# Patient Record
Sex: MALE | Race: WHITE | NOT HISPANIC OR LATINO
[De-identification: names, ages, dates, MRNs, and addresses within clinical notes are randomized per-mention and may not be internally consistent; named-entity substitution may affect disease eponyms.]

---

## 2022-06-03 ENCOUNTER — APPOINTMENT (OUTPATIENT)
Dept: ORTHOPEDIC SURGERY | Facility: CLINIC | Age: 67
End: 2022-06-03
Payer: MEDICARE

## 2022-06-03 VITALS — WEIGHT: 153 LBS | HEIGHT: 68 IN | BODY MASS INDEX: 23.19 KG/M2

## 2022-06-03 DIAGNOSIS — I26.99 OTHER PULMONARY EMBOLISM W/OUT ACUTE COR PULMONALE: ICD-10-CM

## 2022-06-03 DIAGNOSIS — Z78.9 OTHER SPECIFIED HEALTH STATUS: ICD-10-CM

## 2022-06-03 DIAGNOSIS — I10 ESSENTIAL (PRIMARY) HYPERTENSION: ICD-10-CM

## 2022-06-03 PROBLEM — Z00.00 ENCOUNTER FOR PREVENTIVE HEALTH EXAMINATION: Status: ACTIVE | Noted: 2022-06-03

## 2022-06-03 PROCEDURE — 99214 OFFICE O/P EST MOD 30 MIN: CPT

## 2022-06-07 PROBLEM — I26.99 OTHER PULMONARY EMBOLISM WITHOUT ACUTE COR PULMONALE, UNSPECIFIED CHRONICITY: Status: RESOLVED | Noted: 2022-06-03 | Resolved: 2022-06-07

## 2022-06-07 PROBLEM — Z78.9 CURRENT NON-DRINKER OF ALCOHOL: Status: ACTIVE | Noted: 2022-06-03

## 2022-06-07 NOTE — DISCUSSION/SUMMARY
[Medication Risks Reviewed] : Medication risks reviewed [de-identified] : Discussed proper body mechanics and modified physical activity to avoid aggravation of symptoms. Patient will continue with at home exercises/stretches as best tolerated. Patient referred for updated lumbar spine MRI scan for recurring radiating back pain despite medical intervention. Evaluate for stenosis vs disc herniation. Patient will follow up after MRI scan. Patient will continue treatment with Gabapentin and Tramadol as prescribed. Advised patient of habit forming potential with analgesic drug use. Discussed at length underlying pathology of stenosis with associated symptoms. Discussed possible interventional spine injections vs surgical decompression after mri review.

## 2022-06-07 NOTE — HISTORY OF PRESENT ILLNESS
[de-identified] : Patient presents with mid to lower back pain that intermittently radiates into RT lower extremity. Treatment with chiropractic therapy, epidural injections, radio frequency ablation have provided temporary relief. Treatment with Gabapentin and Tramadol as prescribed have provided temporary relief. Patient has continued with at home exercises/stretches as best tolerated. No numbness/tingling sensation to lower extremities b/l.

## 2022-06-07 NOTE — PHYSICAL EXAM
[Normal Coordination] : normal coordination [Normal DTR UE/LE] : normal DTR UE/LE  [Normal Sensation] : normal sensation [Normal Mood and Affect] : normal mood and affect [Orientated] : orientated [Able to Communicate] : able to communicate [Normal Skin] : normal skin [No Rash] : no rash [No Ulcers] : no ulcers [No Lesions] : no lesions [No obvious lymphadenopathy in areas examined] : no obvious lymphadenopathy in areas examined [Well Developed] : well developed [Well Nourished] : well nourished [Peripheral vascular exam is grossly normal] : peripheral vascular exam is grossly normal [No Respiratory Distress] : no respiratory distress [Lungs clear to auscultation bilaterally] : lungs clear to auscultation bilaterally [NL (90)] : forward flexion 90 degrees [NL (30)] : right lateral rotation 30 degrees [NL (45)] : extension 45 degrees [NL (40)] : right lateral bending 40 degrees [Flexion] : flexion [Extension] : extension [4___] : right quadriceps 4[unfilled]/5 [3___] : right dorsiflexors 3[unfilled]/5 [5___] : right extensor hallicus longus 5[unfilled]/5 [] : non-antalgic

## 2022-06-09 ENCOUNTER — FORM ENCOUNTER (OUTPATIENT)
Age: 67
End: 2022-06-09

## 2022-06-10 ENCOUNTER — APPOINTMENT (OUTPATIENT)
Dept: MRI IMAGING | Facility: CLINIC | Age: 67
End: 2022-06-10
Payer: MEDICARE

## 2022-06-10 PROCEDURE — 72148 MRI LUMBAR SPINE W/O DYE: CPT | Mod: MH

## 2022-06-17 ENCOUNTER — APPOINTMENT (OUTPATIENT)
Dept: ORTHOPEDIC SURGERY | Facility: CLINIC | Age: 67
End: 2022-06-17
Payer: MEDICARE

## 2022-06-17 VITALS — WEIGHT: 153 LBS | HEIGHT: 68 IN | BODY MASS INDEX: 23.19 KG/M2

## 2022-06-17 PROCEDURE — 99214 OFFICE O/P EST MOD 30 MIN: CPT

## 2022-06-19 NOTE — PHYSICAL EXAM
[Normal Coordination] : normal coordination [Normal DTR UE/LE] : normal DTR UE/LE  [Normal Sensation] : normal sensation [Normal Mood and Affect] : normal mood and affect [Orientated] : orientated [Able to Communicate] : able to communicate [Normal Skin] : normal skin [No Rash] : no rash [No Ulcers] : no ulcers [No Lesions] : no lesions [No obvious lymphadenopathy in areas examined] : no obvious lymphadenopathy in areas examined [Well Developed] : well developed [Well Nourished] : well nourished [Peripheral vascular exam is grossly normal] : peripheral vascular exam is grossly normal [No Respiratory Distress] : no respiratory distress [Lungs clear to auscultation bilaterally] : lungs clear to auscultation bilaterally [NL (90)] : forward flexion 90 degrees [NL (30)] : right lateral rotation 30 degrees [NL (45)] : extension 45 degrees [NL (40)] : right lateral bending 40 degrees [Flexion] : flexion [Extension] : extension [5___] : right extensor hallicus longus 5[unfilled]/5 [] : not mildly antalgic

## 2022-06-19 NOTE — DISCUSSION/SUMMARY
[Medication Risks Reviewed] : Medication risks reviewed [de-identified] : Patients lumbar MRI was reviewed and discussed. Explained pathology of scoliosis with associated symptoms. Various degrees of DDD. Discussed at length surgical intervention in the form of lumbar fusion. Discussed risks and benefits. Advised patient to continue with radiofrequency ablation and exhaust all conservative treatment before pursuing surgery. Advised patient to continue exercises as best tolerated. Advised patient to have bone density test done. Patient will follow up in 3-4 weeks to discuss if radiofrequency improves symptoms and evaluate pain levels. Patient given referell for bone density test. Will follow up after study.

## 2022-06-19 NOTE — REASON FOR VISIT
[FreeTextEntry2] : Test follow up after mri, done at [facility] on 6/10/2022 of Lumbar Spine

## 2022-06-19 NOTE — HISTORY OF PRESENT ILLNESS
[de-identified] : Patient reports radiofrequency ablation with Dr. Guveara yesterday. Patient reports numbness and pain in RUE in thigh region. Patient has not been receiving chiropractic care recently. Patient has not had bone density tested recently. Patient reports pain level is the same. Patient has been exercising daily at home as best tolerated. Patient reports pain is worse with prolonged standing.Pain is effecting daily activities and quality of life. Conservative treatments provide temporary relief. \par \par

## 2022-07-15 ENCOUNTER — APPOINTMENT (OUTPATIENT)
Dept: ORTHOPEDIC SURGERY | Facility: CLINIC | Age: 67
End: 2022-07-15

## 2022-08-03 ENCOUNTER — APPOINTMENT (OUTPATIENT)
Dept: ORTHOPEDIC SURGERY | Facility: CLINIC | Age: 67
End: 2022-08-03

## 2022-08-03 VITALS — BODY MASS INDEX: 23.19 KG/M2 | WEIGHT: 153 LBS | HEIGHT: 68 IN

## 2022-08-03 PROCEDURE — 99214 OFFICE O/P EST MOD 30 MIN: CPT

## 2022-08-14 NOTE — DATA REVIEWED
[Bone Density] : bone density [MRI] : MRI [Lumbar Spine] : lumbar spine [Report was reviewed and noted in the chart] : The report was reviewed and noted in the chart [I independently reviewed and interpreted images and report] : I independently reviewed and interpreted images and report [I reviewed the films/CD and additionally noted] : I reviewed the films/CD and additionally noted [FreeTextEntry2] : Degenerative lumbar scoliosis, reasonable preservation lumbar lordosis. Post laminectomy changes L2-3. Moderate lateral recess and foraminal stenosis L3-4,L4-5 [FreeTextEntry1] : I stop paperwork reviewed

## 2022-08-14 NOTE — REASON FOR VISIT
[FreeTextEntry2] : Test follow up after Bone Density, done at [facility] \par Ablaton done 07/28/22 with Dr Guevara

## 2022-08-14 NOTE — DISCUSSION/SUMMARY
[Medication Risks Reviewed] : Medication risks reviewed [de-identified] : Patients bone density test was reviewed and discussed, osteopenia diagnosis. Explained pathology of scoliosis with associated symptoms. Various degrees of DDD. At this time, patient wishes to go forward with surgical intervention in the form of lumbar fusion T10-S1. Risks, benefits and expected outcomes have been explained to the patient. Patient was understanding and in agreement. Patient has been refractory to extensive conservative treatments to include medical mgmt, exercise based rehabilitation, activity modification and interventional spine injections.Patient will follow up with surgical jang. \par \vijaya COTTON Acting as a Scribe for Isi Greer, attest that this documentation has been prepared under the direction and in the presence of Provider Mihai Pérez MD.

## 2022-08-14 NOTE — HISTORY OF PRESENT ILLNESS
[de-identified] : Patient presents for a Test follow up after Bone Density, done at [facility]. Reports moderate relief of back pain from ablation with Dr. Guevara approx 1 week ago. He reports 50% improvement of back pain from procedure. Patient has been exercising daily at home as best tolerated. Patient reports pain is worse with prolonged standing. Pain is effecting daily activities and quality of life. Conservative treatments provide temporary relief.\par \par Bone density: Osteopenia. \par

## 2022-08-14 NOTE — PHYSICAL EXAM
[Normal Coordination] : normal coordination [Normal DTR UE/LE] : normal DTR UE/LE  [Normal Sensation] : normal sensation [Normal Mood and Affect] : normal mood and affect [Orientated] : orientated [Able to Communicate] : able to communicate [Normal Skin] : normal skin [No Rash] : no rash [No Ulcers] : no ulcers [No Lesions] : no lesions [No obvious lymphadenopathy in areas examined] : no obvious lymphadenopathy in areas examined [Well Developed] : well developed [Well Nourished] : well nourished [Peripheral vascular exam is grossly normal] : peripheral vascular exam is grossly normal [No Respiratory Distress] : no respiratory distress [Lungs clear to auscultation bilaterally] : lungs clear to auscultation bilaterally [Flexion] : flexion [Extension] : extension [5___] : right extensor hallicus longus 5[unfilled]/5 [] : not mildly antalgic

## 2022-09-06 ENCOUNTER — APPOINTMENT (OUTPATIENT)
Dept: NEUROSURGERY | Facility: CLINIC | Age: 67
End: 2022-09-06

## 2022-09-07 ENCOUNTER — APPOINTMENT (OUTPATIENT)
Dept: ORTHOPEDIC SURGERY | Facility: CLINIC | Age: 67
End: 2022-09-07

## 2022-09-15 ENCOUNTER — APPOINTMENT (OUTPATIENT)
Dept: ORTHOPEDIC SURGERY | Facility: HOSPITAL | Age: 67
End: 2022-09-15

## 2022-09-21 ENCOUNTER — APPOINTMENT (OUTPATIENT)
Dept: ORTHOPEDIC SURGERY | Facility: CLINIC | Age: 67
End: 2022-09-21

## 2022-09-21 ENCOUNTER — OUTPATIENT (OUTPATIENT)
Dept: OUTPATIENT SERVICES | Facility: HOSPITAL | Age: 67
LOS: 1 days | End: 2022-09-21
Payer: MEDICARE

## 2022-09-21 VITALS
SYSTOLIC BLOOD PRESSURE: 126 MMHG | WEIGHT: 155.43 LBS | OXYGEN SATURATION: 98 % | RESPIRATION RATE: 16 BRPM | HEART RATE: 66 BPM | DIASTOLIC BLOOD PRESSURE: 84 MMHG | TEMPERATURE: 96 F | HEIGHT: 67 IN

## 2022-09-21 VITALS — HEIGHT: 68 IN | WEIGHT: 153 LBS | BODY MASS INDEX: 23.19 KG/M2

## 2022-09-21 DIAGNOSIS — G93.9 DISORDER OF BRAIN, UNSPECIFIED: ICD-10-CM

## 2022-09-21 DIAGNOSIS — Z96.649 PRESENCE OF UNSPECIFIED ARTIFICIAL HIP JOINT: Chronic | ICD-10-CM

## 2022-09-21 DIAGNOSIS — Z01.818 ENCOUNTER FOR OTHER PREPROCEDURAL EXAMINATION: ICD-10-CM

## 2022-09-21 DIAGNOSIS — Z98.890 OTHER SPECIFIED POSTPROCEDURAL STATES: Chronic | ICD-10-CM

## 2022-09-21 DIAGNOSIS — Z29.9 ENCOUNTER FOR PROPHYLACTIC MEASURES, UNSPECIFIED: ICD-10-CM

## 2022-09-21 DIAGNOSIS — E78.00 PURE HYPERCHOLESTEROLEMIA, UNSPECIFIED: ICD-10-CM

## 2022-09-21 DIAGNOSIS — I10 ESSENTIAL (PRIMARY) HYPERTENSION: ICD-10-CM

## 2022-09-21 DIAGNOSIS — M48.061 SPINAL STENOSIS, LUMBAR REGION WITHOUT NEUROGENIC CLAUDICATION: ICD-10-CM

## 2022-09-21 DIAGNOSIS — I26.99 OTHER PULMONARY EMBOLISM WITHOUT ACUTE COR PULMONALE: ICD-10-CM

## 2022-09-21 LAB
A1C WITH ESTIMATED AVERAGE GLUCOSE RESULT: 5.3 % — SIGNIFICANT CHANGE UP (ref 4–5.6)
ANION GAP SERPL CALC-SCNC: 12 MMOL/L — SIGNIFICANT CHANGE UP (ref 5–17)
APTT BLD: 30.4 SEC — SIGNIFICANT CHANGE UP (ref 27.5–35.5)
BLD GP AB SCN SERPL QL: SIGNIFICANT CHANGE UP
BUN SERPL-MCNC: 24.5 MG/DL — HIGH (ref 8–20)
CALCIUM SERPL-MCNC: 9.5 MG/DL — SIGNIFICANT CHANGE UP (ref 8.4–10.5)
CHLORIDE SERPL-SCNC: 109 MMOL/L — HIGH (ref 98–107)
CO2 SERPL-SCNC: 21 MMOL/L — LOW (ref 22–29)
CREAT SERPL-MCNC: 0.82 MG/DL — SIGNIFICANT CHANGE UP (ref 0.5–1.3)
EGFR: 96 ML/MIN/1.73M2 — SIGNIFICANT CHANGE UP
ESTIMATED AVERAGE GLUCOSE: 105 MG/DL — SIGNIFICANT CHANGE UP (ref 68–114)
GLUCOSE SERPL-MCNC: 92 MG/DL — SIGNIFICANT CHANGE UP (ref 70–99)
HCT VFR BLD CALC: 41.2 % — SIGNIFICANT CHANGE UP (ref 39–50)
HGB BLD-MCNC: 14.3 G/DL — SIGNIFICANT CHANGE UP (ref 13–17)
INR BLD: 1.09 RATIO — SIGNIFICANT CHANGE UP (ref 0.88–1.16)
MCHC RBC-ENTMCNC: 34.7 GM/DL — SIGNIFICANT CHANGE UP (ref 32–36)
MCHC RBC-ENTMCNC: 35 PG — HIGH (ref 27–34)
MCV RBC AUTO: 101 FL — HIGH (ref 80–100)
MRSA PCR RESULT.: SIGNIFICANT CHANGE UP
PLATELET # BLD AUTO: 198 K/UL — SIGNIFICANT CHANGE UP (ref 150–400)
POTASSIUM SERPL-MCNC: 4.6 MMOL/L — SIGNIFICANT CHANGE UP (ref 3.5–5.3)
POTASSIUM SERPL-SCNC: 4.6 MMOL/L — SIGNIFICANT CHANGE UP (ref 3.5–5.3)
PROTHROM AB SERPL-ACNC: 12.6 SEC — SIGNIFICANT CHANGE UP (ref 10.5–13.4)
RBC # BLD: 4.08 M/UL — LOW (ref 4.2–5.8)
RBC # FLD: 12.2 % — SIGNIFICANT CHANGE UP (ref 10.3–14.5)
S AUREUS DNA NOSE QL NAA+PROBE: SIGNIFICANT CHANGE UP
SARS-COV-2 RNA SPEC QL NAA+PROBE: SIGNIFICANT CHANGE UP
SODIUM SERPL-SCNC: 142 MMOL/L — SIGNIFICANT CHANGE UP (ref 135–145)
WBC # BLD: 5.45 K/UL — SIGNIFICANT CHANGE UP (ref 3.8–10.5)
WBC # FLD AUTO: 5.45 K/UL — SIGNIFICANT CHANGE UP (ref 3.8–10.5)

## 2022-09-21 PROCEDURE — 99213 OFFICE O/P EST LOW 20 MIN: CPT

## 2022-09-21 PROCEDURE — G0463: CPT

## 2022-09-21 PROCEDURE — 93005 ELECTROCARDIOGRAM TRACING: CPT

## 2022-09-21 PROCEDURE — 93010 ELECTROCARDIOGRAM REPORT: CPT

## 2022-09-21 NOTE — HISTORY OF PRESENT ILLNESS
[de-identified] : Patient returns to the office for pre-operative visit. He is scheduled for lumbar laminectomy and fusion 10/6/22. T10 PELVIS TLIF, PSF, LAMINECTOMY L3-5\par \par Since the last office visit His symptoms have remained unchanged.  Preoperative laboratory testing has been completed earlier today. Medical clearance, cardiac clearance and neurology clearance are pending. He has recovered from COVID and is w/o symptoms at this point. Insurance authorization is not necessary.\par \par

## 2022-09-21 NOTE — H&P PST ADULT - NSICDXFAMILYHX_GEN_ALL_CORE_FT
FAMILY HISTORY:  Father  Still living? No  FH: prostate cancer, Age at diagnosis: Age Unknown    Mother  Still living? No  FH: heart attack, Age at diagnosis: Age Unknown

## 2022-09-21 NOTE — H&P PST ADULT - NSANTHBPHIGHRD_ENT_A_CORE
abnormal sputum in past--on empiric ABX-last admit pseudomonas present  ID f/up evaln--re-cx sputum and afb x3 (?nathaniel)--all neg thus far Yes

## 2022-09-21 NOTE — H&P PST ADULT - PROBLEM SELECTOR PLAN 6
T10- Pelvis Transforaminal Lumbar interbody fusion, posterior spine fusion, Laminectomy L3-L5 by dr. Beto Holm on 10/6/22.  Covid vaccine series completed, card in chart, (Moderna X4) he was tested positive for Covid last month(August)  but no PCR test results with him, will repeat the test today, if positive no need for further testing needed if negative will go for the test again 3 days prior to surgery. Medical, Neuro and cardiac clearance pending.

## 2022-09-21 NOTE — H&P PST ADULT - NSANTHOSAYNRD_GEN_A_CORE
No. BUCK screening performed.  STOP BANG Legend: 0-2 = LOW Risk; 3-4 = INTERMEDIATE Risk; 5-8 = HIGH Risk

## 2022-09-21 NOTE — H&P PST ADULT - NSICDXPASTMEDICALHX_GEN_ALL_CORE_FT
PAST MEDICAL HISTORY:  Asthma mild, well controlled not on any meds    COPD, mild     High cholesterol     Hypertension     Lesion of brain     Migraine      PAST MEDICAL HISTORY:  Asthma mild, well controlled not on any meds    COPD, mild     High cholesterol     Hypertension     Lesion of brain     Migraine     Pulmonary embolism On Eliquis     PAST MEDICAL HISTORY:  Asthma mild, well controlled not on any meds    COPD, mild     High cholesterol     Hypertension     Lesion of brain ? patient not sure exactly what it is he said "something with my whitematter" i have migraines due to it.    Migraine     Pulmonary embolism On Eliquis

## 2022-09-21 NOTE — H&P PST ADULT - HISTORY OF PRESENT ILLNESS
67 year old male  with mid to lower back pain that intermittently radiates into RT lower extremity and right hip, Treatment with chiropractic therapy, epidural injections, radio frequency ablation have provided temporary relief. Treatment with Gabapentin and Tramadol as prescribed have provided temporary relief. Patient has continued with at home exercises/stretches as best tolerated. No numbness/tingling sensation to lower extremities b/l. Now he is scheduled for a T10- Pelvis Transforaminal Lumbar interbody fusion, posterior spine fusion, Laminectomy L3-L5 by dr. Beto Holm on 10/6/22.  Covid vaccine series completed, card in chart, (Moderna X4) he was tested positive for Covid last month, but no PCR test results with him, will repeat the test today, if positive no need for further testing needed if negative will go for the test again 3 days prior to surgery. Medical, Neuro and cardiac clearance pending      67 year old male  with mid to lower back pain that intermittently radiates into RT lower extremity and right hip, had treatment with chiropractic therapy, epidural injections, radio frequency ablation have only provided temporary relief. Treatment with Gabapentin and Tramadol as prescribed have provided temporary relief also,  Patient has continued with at home exercises/stretches as best tolerated. No numbness/tingling sensation to lower extremities b/l. Now he is scheduled for a T10- Pelvis Transforaminal Lumbar interbody fusion, posterior spine fusion, Laminectomy L3-L5 by dr. Beto Holm on 10/6/22.  Covid vaccine series completed, card in chart, (Moderna X4) he was tested positive for Covid last month(August)  but no PCR test results with him, will repeat the test today, if positive no need for further testing needed if negative will go for the test again 3 days prior to surgery. Medical, Neuro and cardiac clearance pending

## 2022-09-21 NOTE — DISCUSSION/SUMMARY
[Surgical risks reviewed] : Surgical risks reviewed [de-identified] : Together in the office we reviewed the current diagnosis and its contributions to His symptoms.  The planned surgical procedure was reviewed and explained to His satisfaction including the risks and benefits.  This risk benefit conversation included, but was not limited to infection, bleeding, neurological injury, CSF leak, need for dural repair, hardware mal position, pseudoarthrosis, need for additional operation in the future, risks of general anesthesia. Expected outcomes included reduction in pain, improvement in function and expected recovery timeframe. All questions were answered to their satisfaction.\par \par Patient was reminded to bring their diagnostic imaging to the OR on the date of surgery.\par \par \par \par \par

## 2022-09-21 NOTE — H&P PST ADULT - NSICDXPASTSURGICALHX_GEN_ALL_CORE_FT
PAST SURGICAL HISTORY:  H/O foot surgery right hammer toe    H/O laminectomy     History of hand surgery right wrist X10    History of hip replacement B/L twice, total 4    History of prostate surgery

## 2022-09-21 NOTE — H&P PST ADULT - ASSESSMENT
medications reviewed, instructions given on what medications to take and what not to take. ERP teaching given and the pt verbalized understanding.  67 year old male  with mid to lower back pain that intermittently radiates into RT lower extremity and right hip, had treatment with chiropractic therapy, epidural injections, radio frequency ablation have only provided temporary relief. Treatment with Gabapentin and Tramadol as prescribed have provided temporary relief also,  Patient has continued with at home exercises/stretches as best tolerated. No numbness/tingling sensation to lower extremities b/l. Now he is scheduled for a T10- Pelvis Transforaminal Lumbar interbody fusion, posterior spine fusion, Laminectomy L3-L5 by dr. Beto Holm on 10/6/22.  Covid vaccine series completed, card in chart, (Moderna X4) he was tested positive for Covid last month(August)  but no PCR test results with him, will repeat the test today, if positive no need for further testing needed if negative will go for the test again 3 days prior to surgery. Medical, Neuro and cardiac clearance pending.    CAPRINI VTE 2.0 SCORE [CLOT updated 2019]    AGE RELATED RISK FACTORS                                                       MOBILITY RELATED FACTORS  [ ] Age 41-60 years                                            (1 Point)                    [ ] Bed rest                                                        (1 Point)  [ x] Age: 61-74 years                                           (2 Points)                  [ ] Plaster cast                                                   (2 Points)  [ ] Age= 75 years                                              (3 Points)                    [ ] Bed bound for more than 72 hours                 (2 Points)    DISEASE RELATED RISK FACTORS                                               GENDER SPECIFIC FACTORS  [ ] Edema in the lower extremities                       (1 Point)              [ ] Pregnancy                                                     (1 Point)  [ ] Varicose veins                                               (1 Point)                     [ ] Post-partum < 6 weeks                                   (1 Point)             [ ] BMI > 25 Kg/m2                                            (1 Point)                     [ ] Hormonal therapy  or oral contraception          (1 Point)                 [ ] Sepsis (in the previous month)                        (1 Point)               [ ] History of pregnancy complications                 (1 point)  [ ] Pneumonia or serious lung disease                                               [ ] Unexplained or recurrent                     (1 Point)           (in the previous month)                               (1 Point)  [ ] Abnormal pulmonary function test                     (1 Point)                 SURGERY RELATED RISK FACTORS  [ ] Acute myocardial infarction                              (1 Point)               [ ]  Section                                             (1 Point)  [ ] Congestive heart failure (in the previous month)  (1 Point)      [ ] Minor surgery                                                  (1 Point)   [ ] Inflammatory bowel disease                             (1 Point)               [ ] Arthroscopic surgery                                        (2 Points)  [ ] Central venous access                                      (2 Points)                [x ] General surgery lasting more than 45 minutes (2 points)  [ ] Malignancy- Present or previous                   (2 Points)                [ ] Elective arthroplasty                                         (5 points)    [ ] Stroke (in the previous month)                          (5 Points)                                                                                                                                                           HEMATOLOGY RELATED FACTORS                                                 TRAUMA RELATED RISK FACTORS  [x ] Prior episodes of VTE                                     (3 Points)                [ ] Fracture of the hip, pelvis, or leg                       (5 Points)  [ ] Positive family history for VTE                         (3 Points)             [ ] Acute spinal cord injury (in the previous month)  (5 Points)  [ ] Prothrombin 91628 A                                     (3 Points)               [ ] Paralysis  (less than 1 month)                             (5 Points)  [ ] Factor V Leiden                                             (3 Points)                  [ ] Multiple Trauma within 1 month                        (5 Points)  [ ] Lupus anticoagulants                                     (3 Points)                                                           [ ] Anticardiolipin antibodies                               (3 Points)                                                       [ ] High homocysteine in the blood                      (3 Points)                                             [ ] Other congenital or acquired thrombophilia      (3 Points)                                                [ ] Heparin induced thrombocytopenia                  (3 Points)                                     Total Score [    7      ]  OPIOID RISK TOOL    GUILLERMO EACH BOX THAT APPLIES AND ADD TOTALS AT THE END    FAMILY HISTORY OF SUBSTANCE ABUSE                 FEMALE         MALE                                                Alcohol                             [  ]1 pt          [  ]3pts                                               Illegal Drugs                     [  ]2 pts        [  ]3pts                                               Rx Drugs                           [  ]4 pts        [  ]4 pts    PERSONAL HISTORY OF SUBSTANCE ABUSE                                                                                          Alcohol                             [  ]3 pts       [  ]3 pts                                               Illegal Drugs                     [  ]4 pts        [  ]4 pts                                               Rx Drugs                           [  ]5 pts        [  ]5 pts    AGE BETWEEN 16-45 YEARS                                      [  ]1 pt         [  ]1 pt    HISTORY OF PREADOLESCENT   SEXUAL ABUSE                                                             [  ]3 pts        [  ]0pts    PSYCHOLOGICAL DISEASE                     ADD, OCD, Bipolar, Schizophrenia        [  ]2 pts         [  ]2 pts                      Depression                                               [  ]1 pt           [  ]1 pt           SCORING TOTAL   (add numbers and type here)              ( 0)                                     A score of 3 or lower indicated LOW risk for future opioid abuse  A score of 4 to 7 indicated moderate risk for future opioid abuse  A score of 8 or higher indicates a high risk for opioid abuse          medications reviewed, instructions given on what medications to take and what not to take. ERP teaching given and the pt verbalized understanding.  67 year old male  with mid to lower back pain that intermittently radiates into RT lower extremity and right hip, had treatment with chiropractic therapy, epidural injections, radio frequency ablation have only provided temporary relief. Treatment with Gabapentin and Tramadol as prescribed have provided temporary relief also,  Patient has continued with at home exercises/stretches as best tolerated. No numbness/tingling sensation to lower extremities b/l. Now he is scheduled for a T10- Pelvis Transforaminal Lumbar interbody fusion, posterior spine fusion, Laminectomy L3-L5 by dr. Beto Holm on 10/6/22.  Covid vaccine series completed, card in chart, (Moderna X4) he was tested positive for Covid last month(August)  but no PCR test results with him, will repeat the test today, if positive no need for further testing needed if negative will go for the test again 3 days prior to surgery. Medical, Neuro and cardiac clearance pending.    medications reviewed, instructions given on what medications to take and what not to take. Stop Motrin 7-10 days prior to surgery. Asked the patient to consult with PCP dr. Howard about holding Eliquis and stop/Hold it accordingly, failure to do so may result in cancellation or postponement of your surgery, pt  agreed and verbalized understanding. Asked the patient to take the Blood pressure medication/ heart medication or any other important meds with a sip of water in the AM of surgery ( Gabapentin, Lisinopril, Tramadol) ERP teaching given and the pt verbalized understanding.     CAPRINI VTE 2.0 SCORE [CLOT updated 2019]    AGE RELATED RISK FACTORS                                                       MOBILITY RELATED FACTORS  [ ] Age 41-60 years                                            (1 Point)                    [ ] Bed rest                                                        (1 Point)  [ x] Age: 61-74 years                                           (2 Points)                  [ ] Plaster cast                                                   (2 Points)  [ ] Age= 75 years                                              (3 Points)                    [ ] Bed bound for more than 72 hours                 (2 Points)    DISEASE RELATED RISK FACTORS                                               GENDER SPECIFIC FACTORS  [ ] Edema in the lower extremities                       (1 Point)              [ ] Pregnancy                                                     (1 Point)  [ ] Varicose veins                                               (1 Point)                     [ ] Post-partum < 6 weeks                                   (1 Point)             [ ] BMI > 25 Kg/m2                                            (1 Point)                     [ ] Hormonal therapy  or oral contraception          (1 Point)                 [ ] Sepsis (in the previous month)                        (1 Point)               [ ] History of pregnancy complications                 (1 point)  [ ] Pneumonia or serious lung disease                                               [ ] Unexplained or recurrent                     (1 Point)           (in the previous month)                               (1 Point)  [ ] Abnormal pulmonary function test                     (1 Point)                 SURGERY RELATED RISK FACTORS  [ ] Acute myocardial infarction                              (1 Point)               [ ]  Section                                             (1 Point)  [ ] Congestive heart failure (in the previous month)  (1 Point)      [ ] Minor surgery                                                  (1 Point)   [ ] Inflammatory bowel disease                             (1 Point)               [ ] Arthroscopic surgery                                        (2 Points)  [ ] Central venous access                                      (2 Points)                [x ] General surgery lasting more than 45 minutes (2 points)  [ ] Malignancy- Present or previous                   (2 Points)                [ ] Elective arthroplasty                                         (5 points)    [ ] Stroke (in the previous month)                          (5 Points)                                                                                                                                                           HEMATOLOGY RELATED FACTORS                                                 TRAUMA RELATED RISK FACTORS  [x ] Prior episodes of VTE                                     (3 Points)                [ ] Fracture of the hip, pelvis, or leg                       (5 Points)  [ ] Positive family history for VTE                         (3 Points)             [ ] Acute spinal cord injury (in the previous month)  (5 Points)  [ ] Prothrombin 03195 A                                     (3 Points)               [ ] Paralysis  (less than 1 month)                             (5 Points)  [ ] Factor V Leiden                                             (3 Points)                  [ ] Multiple Trauma within 1 month                        (5 Points)  [ ] Lupus anticoagulants                                     (3 Points)                                                           [ ] Anticardiolipin antibodies                               (3 Points)                                                       [ ] High homocysteine in the blood                      (3 Points)                                             [ ] Other congenital or acquired thrombophilia      (3 Points)                                                [ ] Heparin induced thrombocytopenia                  (3 Points)                                     Total Score [    7      ]  OPIOID RISK TOOL    GUILLERMO EACH BOX THAT APPLIES AND ADD TOTALS AT THE END    FAMILY HISTORY OF SUBSTANCE ABUSE                 FEMALE         MALE                                                Alcohol                             [  ]1 pt          [  ]3pts                                               Illegal Drugs                     [  ]2 pts        [  ]3pts                                               Rx Drugs                           [  ]4 pts        [  ]4 pts    PERSONAL HISTORY OF SUBSTANCE ABUSE                                                                                          Alcohol                             [  ]3 pts       [  ]3 pts                                               Illegal Drugs                     [  ]4 pts        [  ]4 pts                                               Rx Drugs                           [  ]5 pts        [  ]5 pts    AGE BETWEEN 16-45 YEARS                                      [  ]1 pt         [  ]1 pt    HISTORY OF PREADOLESCENT   SEXUAL ABUSE                                                             [  ]3 pts        [  ]0pts    PSYCHOLOGICAL DISEASE                     ADD, OCD, Bipolar, Schizophrenia        [  ]2 pts         [  ]2 pts                      Depression                                               [  ]1 pt           [  ]1 pt           SCORING TOTAL   (add numbers and type here)              ( 0)                                     A score of 3 or lower indicated LOW risk for future opioid abuse  A score of 4 to 7 indicated moderate risk for future opioid abuse  A score of 8 or higher indicates a high risk for opioid abuse          medications reviewed, instructions given on what medications to take and what not to take. ERP teaching given and the pt verbalized understanding.

## 2022-10-05 ENCOUNTER — OUTPATIENT (OUTPATIENT)
Dept: OUTPATIENT SERVICES | Facility: HOSPITAL | Age: 67
LOS: 1 days | End: 2022-10-05

## 2022-10-05 ENCOUNTER — TRANSCRIPTION ENCOUNTER (OUTPATIENT)
Age: 67
End: 2022-10-05

## 2022-10-05 DIAGNOSIS — Z98.890 OTHER SPECIFIED POSTPROCEDURAL STATES: Chronic | ICD-10-CM

## 2022-10-05 DIAGNOSIS — Z96.649 PRESENCE OF UNSPECIFIED ARTIFICIAL HIP JOINT: Chronic | ICD-10-CM

## 2022-10-05 DIAGNOSIS — Z20.828 CONTACT WITH AND (SUSPECTED) EXPOSURE TO OTHER VIRAL COMMUNICABLE DISEASES: ICD-10-CM

## 2022-10-05 PROBLEM — J44.9 CHRONIC OBSTRUCTIVE PULMONARY DISEASE, UNSPECIFIED: Chronic | Status: ACTIVE | Noted: 2022-09-21

## 2022-10-05 PROBLEM — E78.00 PURE HYPERCHOLESTEROLEMIA, UNSPECIFIED: Chronic | Status: ACTIVE | Noted: 2022-09-21

## 2022-10-05 PROBLEM — I26.99 OTHER PULMONARY EMBOLISM WITHOUT ACUTE COR PULMONALE: Chronic | Status: ACTIVE | Noted: 2022-09-21

## 2022-10-05 PROBLEM — G93.9 DISORDER OF BRAIN, UNSPECIFIED: Chronic | Status: ACTIVE | Noted: 2022-09-21

## 2022-10-05 PROBLEM — J45.909 UNSPECIFIED ASTHMA, UNCOMPLICATED: Chronic | Status: ACTIVE | Noted: 2022-09-21

## 2022-10-05 PROBLEM — I10 ESSENTIAL (PRIMARY) HYPERTENSION: Chronic | Status: ACTIVE | Noted: 2022-09-21

## 2022-10-05 PROBLEM — G43.909 MIGRAINE, UNSPECIFIED, NOT INTRACTABLE, WITHOUT STATUS MIGRAINOSUS: Chronic | Status: ACTIVE | Noted: 2022-09-21

## 2022-10-05 NOTE — PATIENT PROFILE ADULT - FALL HARM RISK - HARM RISK INTERVENTIONS
Assistance with ambulation/Assistance OOB with selected safe patient handling equipment/Communicate Risk of Fall with Harm to all staff/Discuss with provider need for PT consult/Monitor gait and stability/Provide patient with walking aids - walker, cane, crutches/Reinforce activity limits and safety measures with patient and family/Sit up slowly, dangle for a short time, stand at bedside before walking/Tailored Fall Risk Interventions/Use of alarms - bed, chair and/or voice tab/Visual Cue: Yellow wristband and red socks/Bed in lowest position, wheels locked, appropriate side rails in place/Call bell, personal items and telephone in reach/Instruct patient to call for assistance before getting out of bed or chair/Non-slip footwear when patient is out of bed/[place] to call system/Physically safe environment - no spills, clutter or unnecessary equipment/Purposeful Proactive Rounding/Room/bathroom lighting operational, light cord in reach

## 2022-10-06 ENCOUNTER — INPATIENT (INPATIENT)
Facility: HOSPITAL | Age: 67
LOS: 6 days | Discharge: NURSING, REHAB OR LONG-TERM CARE | DRG: 456 | End: 2022-10-13
Attending: SPECIALIST | Admitting: SPECIALIST
Payer: MEDICARE

## 2022-10-06 ENCOUNTER — APPOINTMENT (OUTPATIENT)
Dept: ORTHOPEDIC SURGERY | Facility: HOSPITAL | Age: 67
End: 2022-10-06
Payer: MEDICARE

## 2022-10-06 ENCOUNTER — TRANSCRIPTION ENCOUNTER (OUTPATIENT)
Age: 67
End: 2022-10-06

## 2022-10-06 ENCOUNTER — APPOINTMENT (OUTPATIENT)
Age: 67
End: 2022-10-06

## 2022-10-06 VITALS
WEIGHT: 154.32 LBS | OXYGEN SATURATION: 99 % | HEART RATE: 66 BPM | HEIGHT: 67 IN | DIASTOLIC BLOOD PRESSURE: 80 MMHG | SYSTOLIC BLOOD PRESSURE: 148 MMHG | RESPIRATION RATE: 15 BRPM | TEMPERATURE: 98 F

## 2022-10-06 DIAGNOSIS — Z96.649 PRESENCE OF UNSPECIFIED ARTIFICIAL HIP JOINT: Chronic | ICD-10-CM

## 2022-10-06 DIAGNOSIS — Z98.890 OTHER SPECIFIED POSTPROCEDURAL STATES: Chronic | ICD-10-CM

## 2022-10-06 DIAGNOSIS — M48.061 SPINAL STENOSIS, LUMBAR REGION WITHOUT NEUROGENIC CLAUDICATION: ICD-10-CM

## 2022-10-06 LAB
ABO RH CONFIRMATION: SIGNIFICANT CHANGE UP
GAS PNL BLDA: SIGNIFICANT CHANGE UP
GLUCOSE BLDC GLUCOMTR-MCNC: 123 MG/DL — HIGH (ref 70–99)
GLUCOSE BLDC GLUCOMTR-MCNC: 83 MG/DL — SIGNIFICANT CHANGE UP (ref 70–99)

## 2022-10-06 PROCEDURE — 22843 INSERT SPINE FIXATION DEVICE: CPT

## 2022-10-06 PROCEDURE — 22614 ARTHRD PST TQ 1NTRSPC EA ADD: CPT | Mod: 59,62

## 2022-10-06 PROCEDURE — 63048 LAM FACETEC &FORAMOT EA ADDL: CPT | Mod: 59,62

## 2022-10-06 PROCEDURE — 22633 ARTHRD CMBN 1NTRSPC LUMBAR: CPT | Mod: 62

## 2022-10-06 PROCEDURE — 22843 INSERT SPINE FIXATION DEVICE: CPT | Mod: 80

## 2022-10-06 PROCEDURE — 22214 INCIS 1 VERTEBRAL SEG LUMBAR: CPT | Mod: 62

## 2022-10-06 PROCEDURE — 63047 LAM FACETEC & FORAMOT LUMBAR: CPT | Mod: 59,62

## 2022-10-06 PROCEDURE — 22848 INSERT PELV FIXATION DEVICE: CPT

## 2022-10-06 PROCEDURE — 20937 SP BONE AGRFT MORSEL ADD-ON: CPT | Mod: 80

## 2022-10-06 PROCEDURE — 20937 SP BONE AGRFT MORSEL ADD-ON: CPT

## 2022-10-06 PROCEDURE — 22853 INSJ BIOMECHANICAL DEVICE: CPT

## 2022-10-06 PROCEDURE — 22848 INSERT PELV FIXATION DEVICE: CPT | Mod: 80

## 2022-10-06 PROCEDURE — 22853 INSJ BIOMECHANICAL DEVICE: CPT | Mod: 80

## 2022-10-06 DEVICE — SET SCREW EVEREST ATR: Type: IMPLANTABLE DEVICE | Status: FUNCTIONAL

## 2022-10-06 DEVICE — IMPLANTABLE DEVICE: Type: IMPLANTABLE DEVICE | Status: FUNCTIONAL

## 2022-10-06 DEVICE — SURGIFOAM PAD SZ 100: Type: IMPLANTABLE DEVICE | Status: FUNCTIONAL

## 2022-10-06 DEVICE — SPONGE HSTAT SURGCEL 4 X 8IN: Type: IMPLANTABLE DEVICE | Status: FUNCTIONAL

## 2022-10-06 DEVICE — GRAFT BONE INFUSE KIT LG II: Type: IMPLANTABLE DEVICE | Status: FUNCTIONAL

## 2022-10-06 DEVICE — SCREW EVEREST POLY 7.5X45MM: Type: IMPLANTABLE DEVICE | Status: FUNCTIONAL

## 2022-10-06 DEVICE — SEALANT FLOSEAL FAST PREP HEMOSTATIC MATRIX 10ML: Type: IMPLANTABLE DEVICE | Status: FUNCTIONAL

## 2022-10-06 DEVICE — SCREW POLY 7.5X40MM: Type: IMPLANTABLE DEVICE | Status: FUNCTIONAL

## 2022-10-06 DEVICE — KIT A-LINE 1LUM 20GX12CM SAFE KIT: Type: IMPLANTABLE DEVICE | Status: FUNCTIONAL

## 2022-10-06 DEVICE — GRAFT DURAL MATRX 1 X 3IN DURGN: Type: IMPLANTABLE DEVICE | Status: FUNCTIONAL

## 2022-10-06 DEVICE — SCREW EVEREST POLY 6.5X40MM: Type: IMPLANTABLE DEVICE | Status: FUNCTIONAL

## 2022-10-06 DEVICE — GRAFT VITOSIS BIMODAL 10CC: Type: IMPLANTABLE DEVICE | Status: FUNCTIONAL

## 2022-10-06 DEVICE — BONE WAX 2.5GM: Type: IMPLANTABLE DEVICE | Status: FUNCTIONAL

## 2022-10-06 DEVICE — SCREW EVEREST POLY 7.5X50MM: Type: IMPLANTABLE DEVICE | Status: FUNCTIONAL

## 2022-10-06 DEVICE — SCREW POLY 6.5X45MM: Type: IMPLANTABLE DEVICE | Status: FUNCTIONAL

## 2022-10-06 RX ORDER — CEFAZOLIN SODIUM 1 G
2000 VIAL (EA) INJECTION EVERY 8 HOURS
Refills: 0 | Status: DISCONTINUED | OUTPATIENT
Start: 2022-10-06 | End: 2022-10-12

## 2022-10-06 RX ORDER — CEFAZOLIN SODIUM 1 G
2000 VIAL (EA) INJECTION ONCE
Refills: 0 | Status: DISCONTINUED | OUTPATIENT
Start: 2022-10-06 | End: 2022-10-06

## 2022-10-06 RX ORDER — APIXABAN 2.5 MG/1
5 TABLET, FILM COATED ORAL
Refills: 0 | Status: DISCONTINUED | OUTPATIENT
Start: 2022-10-07 | End: 2022-10-13

## 2022-10-06 RX ORDER — ACETAMINOPHEN 500 MG
975 TABLET ORAL ONCE
Refills: 0 | Status: COMPLETED | OUTPATIENT
Start: 2022-10-06 | End: 2022-10-06

## 2022-10-06 RX ORDER — SIMVASTATIN 20 MG/1
1 TABLET, FILM COATED ORAL
Qty: 0 | Refills: 0 | DISCHARGE

## 2022-10-06 RX ORDER — APIXABAN 2.5 MG/1
1 TABLET, FILM COATED ORAL
Qty: 0 | Refills: 0 | DISCHARGE

## 2022-10-06 RX ORDER — APREPITANT 80 MG/1
40 CAPSULE ORAL ONCE
Refills: 0 | Status: DISCONTINUED | OUTPATIENT
Start: 2022-10-06 | End: 2022-10-06

## 2022-10-06 RX ORDER — MAGNESIUM HYDROXIDE 400 MG/1
30 TABLET, CHEWABLE ORAL EVERY 12 HOURS
Refills: 0 | Status: DISCONTINUED | OUTPATIENT
Start: 2022-10-06 | End: 2022-10-13

## 2022-10-06 RX ORDER — METHADONE HYDROCHLORIDE 40 MG/1
20 TABLET ORAL ONCE
Refills: 0 | Status: DISCONTINUED | OUTPATIENT
Start: 2022-10-06 | End: 2022-10-06

## 2022-10-06 RX ORDER — NALOXONE HYDROCHLORIDE 4 MG/.1ML
0.1 SPRAY NASAL
Refills: 0 | Status: DISCONTINUED | OUTPATIENT
Start: 2022-10-06 | End: 2022-10-13

## 2022-10-06 RX ORDER — ACETAMINOPHEN 500 MG
1000 TABLET ORAL ONCE
Refills: 0 | Status: COMPLETED | OUTPATIENT
Start: 2022-10-06 | End: 2022-10-07

## 2022-10-06 RX ORDER — GABAPENTIN 400 MG/1
1200 CAPSULE ORAL
Refills: 0 | Status: DISCONTINUED | OUTPATIENT
Start: 2022-10-06 | End: 2022-10-13

## 2022-10-06 RX ORDER — ONDANSETRON 8 MG/1
4 TABLET, FILM COATED ORAL EVERY 6 HOURS
Refills: 0 | Status: DISCONTINUED | OUTPATIENT
Start: 2022-10-06 | End: 2022-10-13

## 2022-10-06 RX ORDER — ALBUMIN HUMAN 25 %
250 VIAL (ML) INTRAVENOUS
Refills: 0 | Status: DISCONTINUED | OUTPATIENT
Start: 2022-10-06 | End: 2022-10-13

## 2022-10-06 RX ORDER — TOPIRAMATE 25 MG
1 TABLET ORAL
Qty: 0 | Refills: 0 | DISCHARGE

## 2022-10-06 RX ORDER — SODIUM CHLORIDE 9 MG/ML
1000 INJECTION INTRAMUSCULAR; INTRAVENOUS; SUBCUTANEOUS
Refills: 0 | Status: DISCONTINUED | OUTPATIENT
Start: 2022-10-06 | End: 2022-10-07

## 2022-10-06 RX ORDER — SENNA PLUS 8.6 MG/1
2 TABLET ORAL AT BEDTIME
Refills: 0 | Status: DISCONTINUED | OUTPATIENT
Start: 2022-10-06 | End: 2022-10-13

## 2022-10-06 RX ORDER — CHOLINE 650 MG
0 TABLET ORAL
Qty: 0 | Refills: 0 | DISCHARGE

## 2022-10-06 RX ORDER — CELECOXIB 200 MG/1
400 CAPSULE ORAL ONCE
Refills: 0 | Status: COMPLETED | OUTPATIENT
Start: 2022-10-06 | End: 2022-10-06

## 2022-10-06 RX ORDER — SODIUM CHLORIDE 9 MG/ML
3 INJECTION INTRAMUSCULAR; INTRAVENOUS; SUBCUTANEOUS EVERY 8 HOURS
Refills: 0 | Status: DISCONTINUED | OUTPATIENT
Start: 2022-10-06 | End: 2022-10-06

## 2022-10-06 RX ORDER — HYDROMORPHONE HYDROCHLORIDE 2 MG/ML
30 INJECTION INTRAMUSCULAR; INTRAVENOUS; SUBCUTANEOUS
Refills: 0 | Status: DISCONTINUED | OUTPATIENT
Start: 2022-10-06 | End: 2022-10-07

## 2022-10-06 RX ORDER — KETAMINE HYDROCHLORIDE 100 MG/ML
0.25 INJECTION INTRAMUSCULAR; INTRAVENOUS
Qty: 1000 | Refills: 0 | Status: DISCONTINUED | OUTPATIENT
Start: 2022-10-06 | End: 2022-10-06

## 2022-10-06 RX ORDER — ONDANSETRON 8 MG/1
4 TABLET, FILM COATED ORAL ONCE
Refills: 0 | Status: DISCONTINUED | OUTPATIENT
Start: 2022-10-06 | End: 2022-10-06

## 2022-10-06 RX ORDER — HYDROMORPHONE HYDROCHLORIDE 2 MG/ML
0.5 INJECTION INTRAMUSCULAR; INTRAVENOUS; SUBCUTANEOUS
Refills: 0 | Status: DISCONTINUED | OUTPATIENT
Start: 2022-10-06 | End: 2022-10-06

## 2022-10-06 RX ORDER — TRAMADOL HYDROCHLORIDE 50 MG/1
3 TABLET ORAL
Qty: 0 | Refills: 0 | DISCHARGE

## 2022-10-06 RX ORDER — SIMVASTATIN 20 MG/1
20 TABLET, FILM COATED ORAL AT BEDTIME
Refills: 0 | Status: DISCONTINUED | OUTPATIENT
Start: 2022-10-06 | End: 2022-10-13

## 2022-10-06 RX ORDER — ACETAMINOPHEN 500 MG
975 TABLET ORAL EVERY 8 HOURS
Refills: 0 | Status: DISCONTINUED | OUTPATIENT
Start: 2022-10-06 | End: 2022-10-13

## 2022-10-06 RX ORDER — POLYETHYLENE GLYCOL 3350 17 G/17G
17 POWDER, FOR SOLUTION ORAL AT BEDTIME
Refills: 0 | Status: DISCONTINUED | OUTPATIENT
Start: 2022-10-06 | End: 2022-10-13

## 2022-10-06 RX ORDER — FAMOTIDINE 10 MG/ML
20 INJECTION INTRAVENOUS EVERY 12 HOURS
Refills: 0 | Status: DISCONTINUED | OUTPATIENT
Start: 2022-10-06 | End: 2022-10-13

## 2022-10-06 RX ORDER — LISINOPRIL 2.5 MG/1
10 TABLET ORAL DAILY
Refills: 0 | Status: DISCONTINUED | OUTPATIENT
Start: 2022-10-08 | End: 2022-10-13

## 2022-10-06 RX ORDER — METHOCARBAMOL 500 MG/1
750 TABLET, FILM COATED ORAL EVERY 8 HOURS
Refills: 0 | Status: DISCONTINUED | OUTPATIENT
Start: 2022-10-06 | End: 2022-10-07

## 2022-10-06 RX ORDER — LISINOPRIL 2.5 MG/1
1 TABLET ORAL
Qty: 0 | Refills: 0 | DISCHARGE

## 2022-10-06 RX ORDER — GABAPENTIN 400 MG/1
4 CAPSULE ORAL
Qty: 0 | Refills: 0 | DISCHARGE

## 2022-10-06 RX ORDER — BENZOCAINE AND MENTHOL 5; 1 G/100ML; G/100ML
1 LIQUID ORAL EVERY 4 HOURS
Refills: 0 | Status: DISCONTINUED | OUTPATIENT
Start: 2022-10-06 | End: 2022-10-13

## 2022-10-06 RX ADMIN — Medication 975 MILLIGRAM(S): at 06:34

## 2022-10-06 RX ADMIN — HYDROMORPHONE HYDROCHLORIDE 30 MILLILITER(S): 2 INJECTION INTRAMUSCULAR; INTRAVENOUS; SUBCUTANEOUS at 21:11

## 2022-10-06 RX ADMIN — Medication 100 MILLIGRAM(S): at 19:40

## 2022-10-06 RX ADMIN — GABAPENTIN 1200 MILLIGRAM(S): 400 CAPSULE ORAL at 17:56

## 2022-10-06 RX ADMIN — SODIUM CHLORIDE 75 MILLILITER(S): 9 INJECTION INTRAMUSCULAR; INTRAVENOUS; SUBCUTANEOUS at 21:11

## 2022-10-06 RX ADMIN — HYDROMORPHONE HYDROCHLORIDE 30 MILLILITER(S): 2 INJECTION INTRAMUSCULAR; INTRAVENOUS; SUBCUTANEOUS at 16:23

## 2022-10-06 RX ADMIN — METHOCARBAMOL 750 MILLIGRAM(S): 500 TABLET, FILM COATED ORAL at 17:35

## 2022-10-06 RX ADMIN — CELECOXIB 400 MILLIGRAM(S): 200 CAPSULE ORAL at 06:33

## 2022-10-06 NOTE — BRIEF OPERATIVE NOTE - OPERATION/FINDINGS
L5/S1 PSF/ TLIF (L)  Add'l level PSF T10-L5  L3, L4, L5 laminectomy  Cage placement for fusion L5/S1  Segmental instrumentation T10-S1  pelvis Iliac screw instrumentation B/L  local autograft  allograft BMP

## 2022-10-06 NOTE — BRIEF OPERATIVE NOTE - NSICDXBRIEFPOSTOP_GEN_ALL_CORE_FT
POST-OP DIAGNOSIS:  Lumbar stenosis 06-Oct-2022 14:59:58  Zeke Sharma  Degenerative scoliosis 06-Oct-2022 15:00:32  Zeke Sharma

## 2022-10-06 NOTE — BRIEF OPERATIVE NOTE - NSICDXBRIEFPREOP_GEN_ALL_CORE_FT
PRE-OP DIAGNOSIS:  Lumbar stenosis 06-Oct-2022 14:59:35  Zeke Sharma  Degenerative scoliosis 06-Oct-2022 14:59:47  Zeke Sharma

## 2022-10-06 NOTE — CHART NOTE - NSCHARTNOTEFT_GEN_A_CORE
Attempted to post-op check patient at 2000 - patient lethargic, arousable but unable to answer questions or follow commands consistently. Per PACU RN, patient has been like this since out of OR. Anesthesia evaluated patient at bedside, likely from intra-op ketamine/methadone. Will attempt to post-op check when patient more alert.

## 2022-10-06 NOTE — BRIEF OPERATIVE NOTE - NSICDXBRIEFPROCEDURE_GEN_ALL_CORE_FT
PROCEDURES:  Fusion, spine, 3 or more levels, posterior approach, using interbody spinal fusion device 06-Oct-2022 14:58:45  Zeke Sharma  Lumbar laminectomy with facetectomy 06-Oct-2022 14:59:09  Zeke Sharma

## 2022-10-06 NOTE — ASU PREOP CHECKLIST - SELECT TESTS ORDERED
Pt. Called back, gave me authorization to speak to his finace (Karlie).  Informed her of the medication changes and made sure to have patient sign a verbal consent form the next they come in.    Type and Screen

## 2022-10-07 DIAGNOSIS — D62 ACUTE POSTHEMORRHAGIC ANEMIA: ICD-10-CM

## 2022-10-07 DIAGNOSIS — F11.10 OPIOID ABUSE, UNCOMPLICATED: ICD-10-CM

## 2022-10-07 DIAGNOSIS — R41.82 ALTERED MENTAL STATUS, UNSPECIFIED: ICD-10-CM

## 2022-10-07 LAB
ABO RH CONFIRMATION: SIGNIFICANT CHANGE UP
ALBUMIN SERPL ELPH-MCNC: 3 G/DL — LOW (ref 3.3–5.2)
ALP SERPL-CCNC: 43 U/L — SIGNIFICANT CHANGE UP (ref 40–120)
ALT FLD-CCNC: 30 U/L — SIGNIFICANT CHANGE UP
ANION GAP SERPL CALC-SCNC: 11 MMOL/L — SIGNIFICANT CHANGE UP (ref 5–17)
ANION GAP SERPL CALC-SCNC: 13 MMOL/L — SIGNIFICANT CHANGE UP (ref 5–17)
AST SERPL-CCNC: 54 U/L — HIGH
BASOPHILS # BLD AUTO: 0.06 K/UL — SIGNIFICANT CHANGE UP (ref 0–0.2)
BASOPHILS NFR BLD AUTO: 0.8 % — SIGNIFICANT CHANGE UP (ref 0–2)
BILIRUB SERPL-MCNC: 0.3 MG/DL — LOW (ref 0.4–2)
BUN SERPL-MCNC: 28.6 MG/DL — HIGH (ref 8–20)
BUN SERPL-MCNC: 28.8 MG/DL — HIGH (ref 8–20)
CALCIUM SERPL-MCNC: 7.5 MG/DL — LOW (ref 8.4–10.5)
CALCIUM SERPL-MCNC: 7.6 MG/DL — LOW (ref 8.4–10.5)
CHLORIDE SERPL-SCNC: 111 MMOL/L — HIGH (ref 98–107)
CHLORIDE SERPL-SCNC: 111 MMOL/L — HIGH (ref 98–107)
CO2 SERPL-SCNC: 17 MMOL/L — LOW (ref 22–29)
CO2 SERPL-SCNC: 19 MMOL/L — LOW (ref 22–29)
CREAT SERPL-MCNC: 1.25 MG/DL — SIGNIFICANT CHANGE UP (ref 0.5–1.3)
CREAT SERPL-MCNC: 1.29 MG/DL — SIGNIFICANT CHANGE UP (ref 0.5–1.3)
EGFR: 60 ML/MIN/1.73M2 — SIGNIFICANT CHANGE UP
EGFR: 63 ML/MIN/1.73M2 — SIGNIFICANT CHANGE UP
EOSINOPHIL # BLD AUTO: 0.2 K/UL — SIGNIFICANT CHANGE UP (ref 0–0.5)
EOSINOPHIL NFR BLD AUTO: 2.5 % — SIGNIFICANT CHANGE UP (ref 0–6)
GAS PNL BLDA: SIGNIFICANT CHANGE UP
GLUCOSE BLDC GLUCOMTR-MCNC: 88 MG/DL — SIGNIFICANT CHANGE UP (ref 70–99)
GLUCOSE SERPL-MCNC: 105 MG/DL — HIGH (ref 70–99)
GLUCOSE SERPL-MCNC: 92 MG/DL — SIGNIFICANT CHANGE UP (ref 70–99)
HCT VFR BLD CALC: 22.8 % — LOW (ref 39–50)
HCT VFR BLD CALC: 23.3 % — LOW (ref 39–50)
HGB BLD-MCNC: 7.7 G/DL — LOW (ref 13–17)
HGB BLD-MCNC: 7.9 G/DL — LOW (ref 13–17)
IMM GRANULOCYTES NFR BLD AUTO: 0.5 % — SIGNIFICANT CHANGE UP (ref 0–0.9)
LYMPHOCYTES # BLD AUTO: 2.21 K/UL — SIGNIFICANT CHANGE UP (ref 1–3.3)
LYMPHOCYTES # BLD AUTO: 27.6 % — SIGNIFICANT CHANGE UP (ref 13–44)
MAGNESIUM SERPL-MCNC: 1.7 MG/DL — SIGNIFICANT CHANGE UP (ref 1.6–2.6)
MCHC RBC-ENTMCNC: 33 GM/DL — SIGNIFICANT CHANGE UP (ref 32–36)
MCHC RBC-ENTMCNC: 34.2 PG — HIGH (ref 27–34)
MCHC RBC-ENTMCNC: 34.6 GM/DL — SIGNIFICANT CHANGE UP (ref 32–36)
MCHC RBC-ENTMCNC: 35.4 PG — HIGH (ref 27–34)
MCV RBC AUTO: 102.2 FL — HIGH (ref 80–100)
MCV RBC AUTO: 103.6 FL — HIGH (ref 80–100)
MONOCYTES # BLD AUTO: 0.59 K/UL — SIGNIFICANT CHANGE UP (ref 0–0.9)
MONOCYTES NFR BLD AUTO: 7.4 % — SIGNIFICANT CHANGE UP (ref 2–14)
NEUTROPHILS # BLD AUTO: 4.9 K/UL — SIGNIFICANT CHANGE UP (ref 1.8–7.4)
NEUTROPHILS NFR BLD AUTO: 61.2 % — SIGNIFICANT CHANGE UP (ref 43–77)
PHOSPHATE SERPL-MCNC: 4.7 MG/DL — SIGNIFICANT CHANGE UP (ref 2.4–4.7)
PLATELET # BLD AUTO: 100 K/UL — LOW (ref 150–400)
PLATELET # BLD AUTO: 114 K/UL — LOW (ref 150–400)
POTASSIUM SERPL-MCNC: 4.1 MMOL/L — SIGNIFICANT CHANGE UP (ref 3.5–5.3)
POTASSIUM SERPL-MCNC: 4.4 MMOL/L — SIGNIFICANT CHANGE UP (ref 3.5–5.3)
POTASSIUM SERPL-SCNC: 4.1 MMOL/L — SIGNIFICANT CHANGE UP (ref 3.5–5.3)
POTASSIUM SERPL-SCNC: 4.4 MMOL/L — SIGNIFICANT CHANGE UP (ref 3.5–5.3)
PROT SERPL-MCNC: 4.3 G/DL — LOW (ref 6.6–8.7)
RBC # BLD: 2.23 M/UL — LOW (ref 4.2–5.8)
RBC # BLD: 2.25 M/UL — LOW (ref 4.2–5.8)
RBC # FLD: 12.3 % — SIGNIFICANT CHANGE UP (ref 10.3–14.5)
RBC # FLD: 12.6 % — SIGNIFICANT CHANGE UP (ref 10.3–14.5)
SODIUM SERPL-SCNC: 141 MMOL/L — SIGNIFICANT CHANGE UP (ref 135–145)
SODIUM SERPL-SCNC: 141 MMOL/L — SIGNIFICANT CHANGE UP (ref 135–145)
WBC # BLD: 8 K/UL — SIGNIFICANT CHANGE UP (ref 3.8–10.5)
WBC # BLD: 9.01 K/UL — SIGNIFICANT CHANGE UP (ref 3.8–10.5)
WBC # FLD AUTO: 8 K/UL — SIGNIFICANT CHANGE UP (ref 3.8–10.5)
WBC # FLD AUTO: 9.01 K/UL — SIGNIFICANT CHANGE UP (ref 3.8–10.5)

## 2022-10-07 PROCEDURE — 70450 CT HEAD/BRAIN W/O DYE: CPT | Mod: 26

## 2022-10-07 PROCEDURE — 99223 1ST HOSP IP/OBS HIGH 75: CPT

## 2022-10-07 PROCEDURE — 93010 ELECTROCARDIOGRAM REPORT: CPT

## 2022-10-07 RX ORDER — SODIUM CHLORIDE 9 MG/ML
500 INJECTION INTRAMUSCULAR; INTRAVENOUS; SUBCUTANEOUS ONCE
Refills: 0 | Status: COMPLETED | OUTPATIENT
Start: 2022-10-07 | End: 2022-10-07

## 2022-10-07 RX ORDER — ACETAMINOPHEN 500 MG
1000 TABLET ORAL ONCE
Refills: 0 | Status: COMPLETED | OUTPATIENT
Start: 2022-10-07 | End: 2022-10-07

## 2022-10-07 RX ORDER — SODIUM CHLORIDE 9 MG/ML
250 INJECTION INTRAMUSCULAR; INTRAVENOUS; SUBCUTANEOUS ONCE
Refills: 0 | Status: COMPLETED | OUTPATIENT
Start: 2022-10-07 | End: 2022-10-07

## 2022-10-07 RX ORDER — METHOCARBAMOL 500 MG/1
1000 TABLET, FILM COATED ORAL EVERY 6 HOURS
Refills: 0 | Status: DISCONTINUED | OUTPATIENT
Start: 2022-10-07 | End: 2022-10-13

## 2022-10-07 RX ORDER — SODIUM CHLORIDE 9 MG/ML
1000 INJECTION INTRAMUSCULAR; INTRAVENOUS; SUBCUTANEOUS
Refills: 0 | Status: DISCONTINUED | OUTPATIENT
Start: 2022-10-07 | End: 2022-10-13

## 2022-10-07 RX ADMIN — SIMVASTATIN 20 MILLIGRAM(S): 20 TABLET, FILM COATED ORAL at 21:03

## 2022-10-07 RX ADMIN — Medication 975 MILLIGRAM(S): at 21:02

## 2022-10-07 RX ADMIN — Medication 975 MILLIGRAM(S): at 06:14

## 2022-10-07 RX ADMIN — Medication 100 MILLIGRAM(S): at 21:03

## 2022-10-07 RX ADMIN — Medication 100 MILLIGRAM(S): at 13:33

## 2022-10-07 RX ADMIN — METHOCARBAMOL 1000 MILLIGRAM(S): 500 TABLET, FILM COATED ORAL at 13:41

## 2022-10-07 RX ADMIN — APIXABAN 5 MILLIGRAM(S): 2.5 TABLET, FILM COATED ORAL at 18:07

## 2022-10-07 RX ADMIN — Medication 975 MILLIGRAM(S): at 22:02

## 2022-10-07 RX ADMIN — Medication 400 MILLIGRAM(S): at 09:28

## 2022-10-07 RX ADMIN — GABAPENTIN 1200 MILLIGRAM(S): 400 CAPSULE ORAL at 18:05

## 2022-10-07 RX ADMIN — Medication 975 MILLIGRAM(S): at 06:31

## 2022-10-07 RX ADMIN — GABAPENTIN 1200 MILLIGRAM(S): 400 CAPSULE ORAL at 06:14

## 2022-10-07 RX ADMIN — Medication 975 MILLIGRAM(S): at 15:01

## 2022-10-07 RX ADMIN — SENNA PLUS 2 TABLET(S): 8.6 TABLET ORAL at 21:03

## 2022-10-07 RX ADMIN — Medication 400 MILLIGRAM(S): at 00:49

## 2022-10-07 RX ADMIN — Medication 975 MILLIGRAM(S): at 13:33

## 2022-10-07 RX ADMIN — POLYETHYLENE GLYCOL 3350 17 GRAM(S): 17 POWDER, FOR SOLUTION ORAL at 21:03

## 2022-10-07 RX ADMIN — Medication 1000 MILLIGRAM(S): at 01:15

## 2022-10-07 RX ADMIN — SODIUM CHLORIDE 500 MILLILITER(S): 9 INJECTION INTRAMUSCULAR; INTRAVENOUS; SUBCUTANEOUS at 08:33

## 2022-10-07 RX ADMIN — APIXABAN 5 MILLIGRAM(S): 2.5 TABLET, FILM COATED ORAL at 06:14

## 2022-10-07 RX ADMIN — Medication 100 MILLIGRAM(S): at 03:54

## 2022-10-07 RX ADMIN — SODIUM CHLORIDE 500 MILLILITER(S): 9 INJECTION INTRAMUSCULAR; INTRAVENOUS; SUBCUTANEOUS at 04:39

## 2022-10-07 RX ADMIN — SODIUM CHLORIDE 100 MILLILITER(S): 9 INJECTION INTRAMUSCULAR; INTRAVENOUS; SUBCUTANEOUS at 06:13

## 2022-10-07 RX ADMIN — Medication 1000 MILLIGRAM(S): at 09:55

## 2022-10-07 NOTE — CONSULT NOTE ADULT - SUBJECTIVE AND OBJECTIVE BOX
Patient is a 67y old  Male who presents with a chief complaint of Laminectomy L3-L5/ T10- Pelvis Transforaminal Lumbar interbody fusion, posterior spine fusion, (07 Oct 2022 07:45)      HPI:  67 year old male  with mid to lower back pain that intermittently radiates into RT lower extremity and right hip, had treatment with chiropractic therapy, epidural injections, radio frequency ablation have only provided temporary relief. Treatment with Gabapentin and Tramadol as prescribed have provided temporary relief also,  Patient has continued with at home exercises/stretches as best tolerated. No numbness/tingling sensation to lower extremities b/l. Now he is scheduled for a T10- Pelvis Transforaminal Lumbar interbody fusion, posterior spine fusion, Laminectomy L3-L5 by dr. Beto Holm on 10/6/22.  Covid vaccine series completed, card in chart, (Moderna X4) he was tested positive for Covid last month(August)  but no PCR test results with him, will repeat the test today, if positive no need for further testing needed if negative will go for the test again 3 days prior to surgery. Medical, Neuro and cardiac clearance pending      (21 Sep 2022 09:21)            Analgesic Dosing for past 24 hours reviewed as below:    acetaminophen     Tablet ..   975 milliGRAM(s) Oral (10-07-22 @ 06:14)    acetaminophen   IVPB ..   400 mL/Hr IV Intermittent (10-07-22 @ 00:49)    gabapentin   1200 milliGRAM(s) Oral (10-07-22 @ 06:14)   1200 milliGRAM(s) Oral (10-06-22 @ 17:56)    methocarbamol   750 milliGRAM(s) Oral (10-06-22 @ 17:35)          T(C): 37 (10-07-22 @ 08:27), Max: 37 (10-07-22 @ 08:27)  HR: 100 (10-07-22 @ 08:27) (71 - 107)  BP: 100/56 (10-07-22 @ 08:27) (97/63 - 125/67)  RR: 18 (10-07-22 @ 08:27) (12 - 19)  SpO2: 100% (10-07-22 @ 08:27) (96% - 100%)      10-06-22 @ 07:01  -  10-07-22 @ 07:00  --------------------------------------------------------  IN: 470 mL / OUT: 1235 mL / NET: -765 mL        acetaminophen     Tablet .. 975 milliGRAM(s) Oral every 8 hours  acetaminophen   IVPB .. 1000 milliGRAM(s) IV Intermittent once  albumin human  5% IVPB 250 milliLiter(s) IV Intermittent every 2 hours  aluminum hydroxide/magnesium hydroxide/simethicone Suspension 30 milliLiter(s) Oral every 12 hours PRN  apixaban 5 milliGRAM(s) Oral two times a day  benzocaine 15 mG/menthol 3.6 mG Lozenge 1 Lozenge Oral every 4 hours PRN  bisacodyl 5 milliGRAM(s) Oral every 12 hours PRN  ceFAZolin   IVPB 2000 milliGRAM(s) IV Intermittent every 8 hours  famotidine    Tablet 20 milliGRAM(s) Oral every 12 hours PRN  gabapentin 1200 milliGRAM(s) Oral two times a day  magnesium hydroxide Suspension 30 milliLiter(s) Oral every 12 hours PRN  methocarbamol 750 milliGRAM(s) Oral every 8 hours PRN  naloxone Injectable 0.1 milliGRAM(s) IV Push every 3 minutes PRN  ondansetron Injectable 4 milliGRAM(s) IV Push every 6 hours PRN  ondansetron Injectable 4 milliGRAM(s) IV Push every 6 hours PRN  polyethylene glycol 3350 17 Gram(s) Oral at bedtime  senna 2 Tablet(s) Oral at bedtime  simvastatin 20 milliGRAM(s) Oral at bedtime  sodium chloride 0.9%. 1000 milliLiter(s) IV Continuous <Continuous>                          7.7    8.00  )-----------( 114      ( 07 Oct 2022 06:42 )             23.3     10-07    141  |  111<H>  |  28.8<H>  ----------------------------<  92  4.1   |  17.0<L>  |  1.25    Ca    7.5<L>      07 Oct 2022 06:42  Phos  4.7     10-07  Mg     1.7     10-07    TPro  4.3<L>  /  Alb  3.0<L>  /  TBili  0.3<L>  /  DBili  x   /  AST  54<H>  /  ALT  30  /  AlkPhos  43  10-07          Pain Service   [phone] Patient is a 67y old  Male who presents with a chief complaint of Laminectomy L3-L5/ T10- Pelvis Transforaminal Lumbar interbody fusion, posterior spine fusion, (07 Oct 2022 07:45)      HPI:  67 year old male  with mid to lower back pain that intermittently radiates into RT lower extremity and right hip, had treatment with chiropractic therapy, epidural injections, radio frequency ablation have only provided temporary relief. Treatment with Gabapentin and Tramadol as prescribed have provided temporary relief also,  Patient has continued with at home exercises/stretches as best tolerated. No numbness/tingling sensation to lower extremities b/l. Now he is scheduled for a T10- Pelvis Transforaminal Lumbar interbody fusion, posterior spine fusion, Laminectomy L3-L5 by dr. Beto Holm on 10/6/22.  Covid vaccine series completed, card in chart, (Moderna X4) he was tested positive for Covid last month(August)  but no PCR test results with him, will repeat the test today, if positive no need for further testing needed if negative will go for the test again 3 days prior to surgery. Medical, Neuro and cardiac clearance pending      (21 Sep 2022 09:21)      Interval Hx:  Patient seen during rounds  overnight events reviewed  off PCA  still with some AMS  Patient reports pain to be mod controlled on current medications  Patient denies sedation with medications       Analgesic Dosing for past 24 hours reviewed as below:    acetaminophen     Tablet ..   975 milliGRAM(s) Oral (10-07-22 @ 06:14)    acetaminophen   IVPB ..   400 mL/Hr IV Intermittent (10-07-22 @ 00:49)    gabapentin   1200 milliGRAM(s) Oral (10-07-22 @ 06:14)   1200 milliGRAM(s) Oral (10-06-22 @ 17:56)    methocarbamol   750 milliGRAM(s) Oral (10-06-22 @ 17:35)          T(C): 37 (10-07-22 @ 08:27), Max: 37 (10-07-22 @ 08:27)  HR: 100 (10-07-22 @ 08:27) (71 - 107)  BP: 100/56 (10-07-22 @ 08:27) (97/63 - 125/67)  RR: 18 (10-07-22 @ 08:27) (12 - 19)  SpO2: 100% (10-07-22 @ 08:27) (96% - 100%)      10-06-22 @ 07:01  -  10-07-22 @ 07:00  --------------------------------------------------------  IN: 470 mL / OUT: 1235 mL / NET: -765 mL        acetaminophen     Tablet .. 975 milliGRAM(s) Oral every 8 hours  acetaminophen   IVPB .. 1000 milliGRAM(s) IV Intermittent once  albumin human  5% IVPB 250 milliLiter(s) IV Intermittent every 2 hours  aluminum hydroxide/magnesium hydroxide/simethicone Suspension 30 milliLiter(s) Oral every 12 hours PRN  apixaban 5 milliGRAM(s) Oral two times a day  benzocaine 15 mG/menthol 3.6 mG Lozenge 1 Lozenge Oral every 4 hours PRN  bisacodyl 5 milliGRAM(s) Oral every 12 hours PRN  ceFAZolin   IVPB 2000 milliGRAM(s) IV Intermittent every 8 hours  famotidine    Tablet 20 milliGRAM(s) Oral every 12 hours PRN  gabapentin 1200 milliGRAM(s) Oral two times a day  magnesium hydroxide Suspension 30 milliLiter(s) Oral every 12 hours PRN  methocarbamol 750 milliGRAM(s) Oral every 8 hours PRN  naloxone Injectable 0.1 milliGRAM(s) IV Push every 3 minutes PRN  ondansetron Injectable 4 milliGRAM(s) IV Push every 6 hours PRN  ondansetron Injectable 4 milliGRAM(s) IV Push every 6 hours PRN  polyethylene glycol 3350 17 Gram(s) Oral at bedtime  senna 2 Tablet(s) Oral at bedtime  simvastatin 20 milliGRAM(s) Oral at bedtime  sodium chloride 0.9%. 1000 milliLiter(s) IV Continuous <Continuous>                          7.7    8.00  )-----------( 114      ( 07 Oct 2022 06:42 )             23.3     10-07    141  |  111<H>  |  28.8<H>  ----------------------------<  92  4.1   |  17.0<L>  |  1.25    Ca    7.5<L>      07 Oct 2022 06:42  Phos  4.7     10-07  Mg     1.7     10-07    TPro  4.3<L>  /  Alb  3.0<L>  /  TBili  0.3<L>  /  DBili  x   /  AST  54<H>  /  ALT  30  /  AlkPhos  43  10-07          Pain Service   [phone]

## 2022-10-07 NOTE — DISCHARGE NOTE PROVIDER - CARE PROVIDER_API CALL
Mihai Pérez)  Orthopaedic Surgery  [address]  [place] [address]  Phone: [phone]  Fax: [phone]  Follow Up Time:

## 2022-10-07 NOTE — DISCHARGE NOTE PROVIDER - NSDCFUSCHEDAPPT_GEN_ALL_CORE_FT
[facility] Physician [facility]  [address]  Scheduled Appointment: 10/19/2022

## 2022-10-07 NOTE — CONSULT NOTE ADULT - PROBLEM SELECTOR RECOMMENDATION 4
Unclear, not mentioned in PCP note or Neurology note, however, it was recommend since 2019 that patient have MRI which has never been performed. If mental status does not improve, may need MRI

## 2022-10-07 NOTE — CONSULT NOTE ADULT - PROBLEM SELECTOR RECOMMENDATION 2
As per notes is followed by Neurologist for cognitive decline over past 15 years.  Possible related to anesthesia/narcotics  Stop PCA  Check UA  ABG reviewed  check EKG  Discussion with /partner Jerel Garcia, patient has history of memory loss and has had prior issues post anesthesia

## 2022-10-07 NOTE — PHYSICAL THERAPY INITIAL EVALUATION ADULT - IMPAIRMENTS FOUND, PT EVAL
aerobic capacity/endurance/arousal, attention, and cognition/cognitive impairment/gait, locomotion, and balance/muscle strength/poor safety awareness/ROM

## 2022-10-07 NOTE — DISCHARGE NOTE PROVIDER - NSDCFUADDINST_GEN_ALL_CORE_FT
* Office follow up in 7-10 Days. Please call office to set up appt upon DC.  * Patient may shower once drain is removed or POD#2 if no drain present. Please change dressings daily after shower if they get wet. Do NOT soak in tub or pool.   * TLSO brace if given should NOT be worn in bed or for hygiene.   * Patient may complete ADLs. No lifting greater than 2lbs. No aggressive PT. Patient may walk outdoors without exerting oneself.   * Patient will take Senna S or similar medication to help prevent narcotic induced constipation.   * Patient may resume home medications as prescribed unless noted.   * Complete medrol dosepak as prescibed   * Call office with any concerns.  * Office follow up in 7-10 Days. Please call office to set up appt upon DC.  * Patient may shower when stable on feet Please change dressings daily after shower if they get wet. Do NOT soak in tub or pool.   * TLSO brace if given should NOT be worn in bed or for hygiene.   * Patient may complete ADLs. No lifting greater than 2lbs. No aggressive PT. Patient may walk outdoors without exerting oneself.   * Patient will take Senna S or similar medication to help prevent narcotic induced constipation.   * Patient may resume home medications as prescribed unless noted.   * Call office with any concerns.

## 2022-10-07 NOTE — DISCHARGE NOTE PROVIDER - HOSPITAL COURSE
Patient was admitted for elective posterior fusion  T10-S1 on 10/6/22 for failed conservative treatment of persistent lower back pain. The hospital post operative course was uneventful. The surgical dressing was changed and the drain was removed uneventfully.  PT/OT worked with patient for gait training. Pain was now adequately controlled and patient was discharged home in stable condition. Chronic medical conditions were treated during the hospital stay.

## 2022-10-07 NOTE — PROGRESS NOTE ADULT - SUBJECTIVE AND OBJECTIVE BOX
Ortho Progress note    Name: SAMANTA PARKER    MR #: [id number]    Procedure: T10-S1 fusion, L3-L5 laminectomy  Surgeons: Beto/ Edith    Pt seen resting comfortably in bed this morning  Pt still confused and not able to follow verbal commands  Head CT was performed overnight- negative for acute intracranial hemorrhage with chronic small vessel changes    General Exam:  Vital Signs Last 24 Hrs  T(C): 37 (07 Oct 2022 08:27), Max: 37 (07 Oct 2022 08:27)  T(F): 98.6 (07 Oct 2022 08:27), Max: 98.6 (07 Oct 2022 08:27)  HR: 100 (07 Oct 2022 08:27) (71 - 107)  BP: 100/56 (07 Oct 2022 08:27) (97/63 - 125/67)  BP(mean): 75 (06 Oct 2022 20:45) (67 - 87)  RR: 18 (07 Oct 2022 08:27) (12 - 19)  SpO2: 100% (07 Oct 2022 08:27) (96% - 100%)    Parameters below as of 07 Oct 2022 08:27  Patient On (Oxygen Delivery Method): nasal cannula                          7.7    8.00  )-----------( 114      ( 07 Oct 2022 06:42 )             23.3     General: lethargic yet arousable, minimally responsive to commands or questions  Dressings intact, HV x 2, small area of serosanguinous staining proximal dressing  Pulses: distal pulses intact Cap refill < 2 sec.  Sensation: unable to assess  Motor: patient not following commands, spontaneously moving extremities    A/P: 67yMale POD#1 s/p T10-S1 fusion, L3-L5 laminectomy    - Transfuse today  - Pain Control, PM consult will be placed, PCA discontinued  - DVT ppx: eliquis  - WBAT when stable  - Will monitor drain output  - Medicine team following

## 2022-10-07 NOTE — CHART NOTE - NSCHARTNOTEFT_GEN_A_CORE
Head CT noted, patient mentating the same as per nurse. Medical team following    < from: CT Head No Cont (10.07.22 @ 01:59) >    ******PRELIMINARY REPORT******      ******PRELIMINARY REPORT******     ACC: [id number] EXAM:  CT BRAIN                          PROCEDURE DATE:  10/07/2022    ******PRELIMINARY REPORT******      ******PRELIMINARY REPORT******           INTERPRETATION:  No acute intracranial hemorrhage or mass effect.   Moderate chronic small vessel ischemic changes.        ******PRELIMINARY REPORT******      ******PRELIMINARY REPORT******         MARSHA ALARCON DO; Attending Radiologist  This document is a PRELIMINARY interpretation and is pending final   attending approval. Oct  7 2022  3:08AM    < end of copied text >

## 2022-10-07 NOTE — PHYSICAL THERAPY INITIAL EVALUATION ADULT - PERTINENT HX OF CURRENT PROBLEM, REHAB EVAL
Pt is 67 year old male  with PMH of chronic pain, due to severe degenerative disc disease, prior h/o narcotic addiction, H/O PE on Eliquis, HLD, HTN, asthma, cognitive impairment ( follows with Neurologist Karl Alvarado [place]) depression, remote H/O afibb, migraine, recent COVID 8/22, now S/P  T10- Pelvis Transforaminal Lumbar interbody fusion, posterior spine fusion, Laminectomy L3-L5

## 2022-10-07 NOTE — CONSULT NOTE ADULT - SUBJECTIVE AND OBJECTIVE BOX
HPI:  67 year old male  with PMH of chronic pain, due to severe degenerative disc disease, prior h/o narcotic addiction, H/O PE on Eliquis, HLD, HTN, asthma, cognitive impairment ( follows with Neurologist Karl Alvarado [place]) depression, remote H/O afibb, migraine, recent COVID 8/22, now S/P  T10- Pelvis Transforaminal Lumbar interbody fusion, posterior spine fusion, Laminectomy L3-L5 by dr. Beto Holm on 10/6/22.  As per overnight notes, patient  has been lethargic since PACU. Lethargy presumed to be  2/2 intra-op ketamine/methadone. Has not improved for several hrs and medical team was asked to evaluate. CTH: No acute intracranial hemorrhage or mass effect. Moderate chronic small vessel ischemic changes. Labs significant for drop in HGB from 14.3 to 7.7. Patient seen and examined. Unable to follow commands, disoriented. As per RN has been on PCA pump and has received 2 doses as of this morning. C/O back pain when moved for exam.         PAST MEDICAL & SURGICAL HISTORY:  Hypertension      Lesion of brain  ? patient not sure exactly what it is he said &quot;something with my whitematter&quot; i have migraines due to it.      Migraine      High cholesterol      COPD, mild      Asthma  mild, well controlled not on any meds      Pulmonary embolism  On Eliquis      History of hip replacement  B/L twice, total 4      History of hand surgery  right wrist X10      History of prostate surgery      H/O laminectomy      H/O foot surgery  right hammer toe          MEDICATIONS  (STANDING):  acetaminophen     Tablet .. 975 milliGRAM(s) Oral every 8 hours  albumin human  5% IVPB 250 milliLiter(s) IV Intermittent every 2 hours  apixaban 5 milliGRAM(s) Oral two times a day  ceFAZolin   IVPB 2000 milliGRAM(s) IV Intermittent every 8 hours  gabapentin 1200 milliGRAM(s) Oral two times a day  polyethylene glycol 3350 17 Gram(s) Oral at bedtime  senna 2 Tablet(s) Oral at bedtime  simvastatin 20 milliGRAM(s) Oral at bedtime  sodium chloride 0.9%. 1000 milliLiter(s) (100 mL/Hr) IV Continuous <Continuous>    MEDICATIONS  (PRN):  aluminum hydroxide/magnesium hydroxide/simethicone Suspension 30 milliLiter(s) Oral every 12 hours PRN Indigestion  benzocaine 15 mG/menthol 3.6 mG Lozenge 1 Lozenge Oral every 4 hours PRN Sore Throat  bisacodyl 5 milliGRAM(s) Oral every 12 hours PRN Constipation  famotidine    Tablet 20 milliGRAM(s) Oral every 12 hours PRN Dyspepsia  magnesium hydroxide Suspension 30 milliLiter(s) Oral every 12 hours PRN Constipation  methocarbamol 750 milliGRAM(s) Oral every 8 hours PRN Muscle Spasm  naloxone Injectable 0.1 milliGRAM(s) IV Push every 3 minutes PRN For ANY of the following changes in patient status:  A. RR LESS THAN 10 breaths per minute, B. Oxygen saturation LESS THAN 90%, C. Sedation score of 6  ondansetron Injectable 4 milliGRAM(s) IV Push every 6 hours PRN Nausea  ondansetron Injectable 4 milliGRAM(s) IV Push every 6 hours PRN Nausea and/or Vomiting      Allergies    No Known Allergies    Intolerances        SOCIAL HISTORY:  H/O narcotic abuse  No H/O ETOH abuse as per notes    FAMILY HISTORY:  FH: heart attack (Mother)    FH: prostate cancer (Father)        Vital Signs Last 24 Hrs  T(C): 36.9 (07 Oct 2022 06:45), Max: 36.9 (07 Oct 2022 01:30)  T(F): 98.4 (07 Oct 2022 06:45), Max: 98.5 (07 Oct 2022 01:30)  HR: 98 (07 Oct 2022 06:45) (71 - 107)  BP: 98/60 (07 Oct 2022 06:45) (97/63 - 125/67)  BP(mean): 75 (06 Oct 2022 20:45) (67 - 87)  RR: 18 (07 Oct 2022 06:45) (12 - 19)  SpO2: 100% (07 Oct 2022 06:45) (96% - 100%)    Parameters below as of 07 Oct 2022 03:57  Patient On (Oxygen Delivery Method): nasal cannula    ROS:   Limited due to current mental status            PHYSICAL EXAM:    General: NAD, lethargic, easily aroused  Eyes: Pupils are 3, constrict to bright light  Head: Normocephalic; atraumatic  ENMT: No nasal discharge; airway clear  Neck: Supple; non tender; no JVD  Respiratory: No wheezes, rales or rhonchi  Cardiovascular: Regular rate and rhythm. S1 and S2 Normal; No murmurs, gallops or rubs  Gastrointestinal: Soft non-tender non-distended; Normal bowel sounds  Extremities: No clubbing, cyanosis or edema  Vascular: Peripheral pulses palpable 2+ bilaterally  Neurological: Oriented to self only, does not follow commands, MENA x 4  Skin: Warm and dry. No acute rash                          7.7    8.00  )-----------( 114      ( 07 Oct 2022 06:42 )             23.3     10-07    141  |  111<H>  |  28.6<H>  ----------------------------<  105<H>  4.4   |  19.0<L>  |  1.29    Ca    7.6<L>      07 Oct 2022 01:27  Phos  4.7     10-07  Mg     1.7     10-07    TPro  4.3<L>  /  Alb  3.0<L>  /  TBili  0.3<L>  /  DBili  x   /  AST  54<H>  /  ALT  30  /  AlkPhos  43  10-07            RADIOLOGY & ADDITIONAL STUDIES:

## 2022-10-07 NOTE — OCCUPATIONAL THERAPY INITIAL EVALUATION ADULT - PERTINENT HX OF CURRENT PROBLEM, REHAB EVAL
Pt is 67 year old male  with PMH of chronic pain, due to severe degenerative disc disease, prior h/o narcotic addiction, H/O PE on Eliquis, HLD, HTN, asthma, cognitive impairment ( follows with Neurologist Karl Alvarado [place]) depression, remote H/O afibb, migraine, recent COVID 8/22, now S/P  T10- Pelvis Transforaminal Lumbar interbody fusion,

## 2022-10-07 NOTE — CONSULT NOTE ADULT - PROBLEM SELECTOR RECOMMENDATION 9
Eliquis  Bowel RX  Protonix S/P  T10- Pelvis Transforaminal Lumbar interbody fusion, posterior spine fusion, Laminectomy L3-L5 by dr. Beto Holm on 10/6/22  Post op ABX as per SCIP  Hold Narcotic analgesics due to AMS  DVT ppx: Eliquis  IS

## 2022-10-07 NOTE — OCCUPATIONAL THERAPY INITIAL EVALUATION ADULT - ADDITIONAL COMMENTS
as per pt, was independent prior and lives alone, pt is poor historian due to confusion, PLOF and social hx needs to be confirmed

## 2022-10-07 NOTE — CONSULT NOTE ADULT - NS ATTEND AMEND GEN_ALL_CORE FT
Patient seen and examined with NP then reassessed later ,   AAOX 1 to self and " Hospital" , now following simple  commands       Vital Signs Last 24 Hrs  T(C): 37 (07 Oct 2022 11:09), Max: 37.1 (07 Oct 2022 10:50)  T(F): 98.6 (07 Oct 2022 11:09), Max: 98.8 (07 Oct 2022 10:50)  HR: 92 (07 Oct 2022 11:09) (71 - 107)  BP: 108/58 (07 Oct 2022 11:09) (96/58 - 125/67)  BP(mean): 75 (06 Oct 2022 20:45) (67 - 87)  ABP: 115/57 (06 Oct 2022 18:30) (93/45 - 118/50)  ABP(mean): 73 (06 Oct 2022 18:30) (63 - 80)  RR: 16 (07 Oct 2022 11:09) (12 - 19)  SpO2: 96% (07 Oct 2022 11:09) (96% - 100%)    O2 Parameters below as of 07 Oct 2022 11:09  Patient On (Oxygen Delivery Method): nasal cannula  O2 Flow (L/min): 2    < from: CT Head No Cont (10.07.22 @ 01:59) >      ACC: [id number] EXAM:  CT BRAIN                          PROCEDURE DATE:  10/07/2022     < end of copied text >    < from: CT Head No Cont (10.07.22 @ 01:59) >      IMPRESSION:  Mild to moderate chronic microvascular changes without   evidence of an acute transcortical infarction or hemorrhage. Preliminary   reportprovided by MARSHA ALARCON DO; Attending Radiologist.    --- End of Report ---    Above noted and reviewed with NP .   AMS - likely acute postoperative delirium - known with same ,   hx of opioid use .   STOP PCA  Pain mgmt     Anemia - acute blood lost anemia - secondary to surgical blood lost - TRANSFUSE 2 units PRBC,   follow UA, EKG     Patient AMS - already improving     NAGMA- will give bicarb ,   follow labs in am     Thank you for the courtesy of this consult ,   will follow along with you . Patient seen and examined with NP then reassessed later ,   AAOX 1 to self and " Hospital" , now following simple  commands       Vital Signs Last 24 Hrs  T(C): 37 (07 Oct 2022 11:09), Max: 37.1 (07 Oct 2022 10:50)  T(F): 98.6 (07 Oct 2022 11:09), Max: 98.8 (07 Oct 2022 10:50)  HR: 92 (07 Oct 2022 11:09) (71 - 107)  BP: 108/58 (07 Oct 2022 11:09) (96/58 - 125/67)  BP(mean): 75 (06 Oct 2022 20:45) (67 - 87)  ABP: 115/57 (06 Oct 2022 18:30) (93/45 - 118/50)  ABP(mean): 73 (06 Oct 2022 18:30) (63 - 80)  RR: 16 (07 Oct 2022 11:09) (12 - 19)  SpO2: 96% (07 Oct 2022 11:09) (96% - 100%)    O2 Parameters below as of 07 Oct 2022 11:09  Patient On (Oxygen Delivery Method): nasal cannula  O2 Flow (L/min): 2    < from: CT Head No Cont (10.07.22 @ 01:59) >      ACC: [id number] EXAM:  CT BRAIN                          PROCEDURE DATE:  10/07/2022     < end of copied text >    < from: CT Head No Cont (10.07.22 @ 01:59) >      IMPRESSION:  Mild to moderate chronic microvascular changes without   evidence of an acute transcortical infarction or hemorrhage. Preliminary   reportprovided by MARSHA ALARCON DO; Attending Radiologist.    --- End of Report ---    Above noted and reviewed with NP .   AMS - likely acute postoperative delirium - known with same ,   hx of opioid use .   STOP PCA  Pain mgmt     Anemia - acute blood lost anemia - secondary to surgical blood lost - TRANSFUSE 2 units PRBC,   follow UA, EKG     Patient AMS - already improving     NAGMA- continue IVF, transfuse   follow labs in am     Thank you for the courtesy of this consult ,   will follow along with you .

## 2022-10-07 NOTE — CONSULT NOTE ADULT - ASSESSMENT
67 year old male  with PMH of chronic pain, due to severe degenerative disc disease, prior h/o narcotic addiction, H/O PE on Eliquis, HLD, HTN, asthma, cognitive impairment ( follows with Neurologist Karl Alvarado [place]) depression, remote H/O afibb, migraine, recent COVID 8/22, now S/P  T10- Pelvis Transforaminal Lumbar interbody fusion, posterior spine fusion, Laminectomy L3-L5 by dr. Beto Holm on 10/6/22.  As per overnight notes, patient  has been lethargic since PACU. Lethargy presumed to be  2/2 intra-op ketamine/methadone. Has not improved for several hrs and medical team was asked to evaluate. CTH: No acute intracranial hemorrhage or mass effect. Moderate chronic small vessel ischemic changes. Labs significant for drop in HGB from 14.3 to 7.7. Patient seen and examined. Unable to follow commands, disoriented. 
67M  chronic pain on tramadol prn and gabapentin 1200 bid  hx of opioid abuse  partner requesting no opioids on discharge  s/p thoracic to sacrum fusion 10/6    postop course complicated by ams, which partner states is common    med recs:  change robaxin to 1000mg qid PRN muscle spasms    if pain remains uncontrolled:  start dilaudid 2mg q8h prn sev pain
social work

## 2022-10-07 NOTE — DISCHARGE NOTE PROVIDER - NSDCCPTREATMENT_GEN_ALL_CORE_FT
PRINCIPAL PROCEDURE  Procedure: Fusion, spine, 3 or more levels, posterior approach, using interbody spinal fusion device  Findings and Treatment:       SECONDARY PROCEDURE  Procedure: Lumbar laminectomy with facetectomy  Findings and Treatment:

## 2022-10-07 NOTE — CHART NOTE - NSCHARTNOTEFT_GEN_A_CORE
Pt seen and examined at bedside for lethargy s/p OR this afternoon for a T10- Pelvis Transforaminal Lumbar interbody fusion, posterior spine fusion, Laminectomy L3-L5.  Has been lethargic since PACU. Lethargy likely 2/2 intra-op ketamine/methadone. Has not improved for several hrs and medical team asked to evaluate.     On exam pt is lethargic, but awake and alert. Very slow to respond to follow commands. Intermittently responds to questions, slow thought process and repeating himself often.    ROS unable to be obtained 2/2 lethargy.     VITAL SIGNS:  T(C): 36.4 (10-06-22 @ 21:22), Max: 36.7 (10-06-22 @ 06:21)  T(F): 97.6 (10-06-22 @ 21:22), Max: 98 (10-06-22 @ 06:21)  HR: 92 (10-06-22 @ 21:22) (66 - 105)  BP: 124/67 (10-06-22 @ 21:22) (100/56 - 148/80)  RR: 18 (10-06-22 @ 21:22) (12 - 19)  SpO2: 100% (10-06-22 @ 21:22) (96% - 100%)  Wt(kg): --    Blood Glucose: 88    PHYSICAL EXAM:  GENERAL: NAD, lying in bed comfortably  HEAD:  Atraumatic, Normocephalic  EYES: EOMI, conjunctiva and sclera clear  CHEST/LUNG: Unlabored respirations  HEART: +S1, S2  NERVOUS SYSTEM:  Alert, speech clear. Pupils pinpoint, sluggish to respond. Able to follow commands. Equal strength b/l. CN 2-12 intact. No facial droop. No focal neurological deficits .   MSK: FROM   SKIN: Warm, dry  PSYCH: Flat Affect    A/P:    67 year old male s/p T10- Pelvis Transforaminal Lumbar interbody fusion, posterior spine fusion, Laminectomy L3-L5.     Lethargy; likely 2/2 intra-op ketamine/methadone however will r/o additional causes as lethargy not improving  -ABG w/ lytes  -CBC, CMP, Mag, Phos  -CT Head non-contrast  -Avoid sedating medications  -D/w Ortho PA  -D/w Dr. Henriquez Pt seen and examined at bedside for lethargy s/p OR this afternoon for a T10- Pelvis Transforaminal Lumbar interbody fusion, posterior spine fusion, Laminectomy L3-L5.  Has been lethargic since PACU. Lethargy likely 2/2 intra-op ketamine/methadone. Has not improved for several hrs and medical team asked to evaluate.     On exam pt is lethargic, but awake and alert. Very slow to respond to follow commands. Intermittently responds to questions, slow thought process and repeating himself often.    ROS unable to be obtained 2/2 lethargy.     VITAL SIGNS:  T(C): 36.9 (10-07-22 @ 01:30), Max: 36.9 (10-07-22 @ 01:30)  T(F): 98.5 (10-07-22 @ 01:30), Max: 98.5 (10-07-22 @ 01:30)  HR: 107 (10-07-22 @ 01:30) (66 - 107)  BP: 97/63 (10-07-22 @ 01:30) (97/63 - 148/80)  RR: 18 (10-07-22 @ 01:30) (12 - 19)  SpO2: 97% (10-07-22 @ 01:30) (96% - 100%)  Wt(kg): --    Blood Glucose: 88    PHYSICAL EXAM:  GENERAL: NAD, lying in bed comfortably  HEAD:  Atraumatic, Normocephalic  EYES: EOMI, conjunctiva and sclera clear  CHEST/LUNG: Unlabored respirations  HEART: +S1, S2  NERVOUS SYSTEM:  Alert, speech clear. Pupils pinpoint, sluggish to respond. Able to follow commands. Equal strength b/l. CN 2-12 intact. No facial droop. No focal neurological deficits .   MSK: FROM   SKIN: Warm, dry  PSYCH: Flat Affect    A/P:    67 year old male s/p T10- Pelvis Transforaminal Lumbar interbody fusion, posterior spine fusion, Laminectomy L3-L5.     Lethargy; likely 2/2 intra-op ketamine/methadone however will r/o additional causes as lethargy not improving  -ABG w/ lytes  -CBC, CMP, Mag, Phos  -CT Head non-contrast  -Avoid sedating medications  -D/w Ortho PA  -D/w Dr. Henriquez

## 2022-10-07 NOTE — DISCHARGE NOTE PROVIDER - NSDCMRMEDTOKEN_GEN_ALL_CORE_FT
Eliquis 5 mg oral tablet: 1 tab(s) orally 2 times a day  gabapentin 300 mg oral capsule: 4 cap(s) orally 2 times a day  indomethacin 50 mg oral capsule: 1 cap(s) orally 2 times a day  lisinopril 10 mg oral tablet: 1 tab(s) orally once a day  Motrin 800 mg oral tablet: 1 tab(s) orally once a day, As Needed  simvastatin 20 mg oral tablet: 1 tab(s) orally once a day (at bedtime)  topiramate 200 mg oral tablet: 1 tab(s) orally 2 times a day  topiramate 50 mg oral tablet: 1 tab(s) orally 2 times a day  traMADol 50 mg oral tablet: 3 tab(s) orally 2 times a day, As Needed   acetaminophen 325 mg oral tablet: 3 tab(s) orally every 8 hours  Eliquis 5 mg oral tablet: 1 tab(s) orally 2 times a day  gabapentin 300 mg oral capsule: 4 cap(s) orally 2 times a day  lisinopril 10 mg oral tablet: 1 tab(s) orally once a day  methocarbamol 500 mg oral tablet: 2 tab(s) orally every 6 hours, As needed, Muscle Spasm  senna leaf extract oral tablet: 2 tab(s) orally once a day (at bedtime)  simvastatin 20 mg oral tablet: 1 tab(s) orally once a day (at bedtime)  topiramate 200 mg oral tablet: 1 tab(s) orally 2 times a day  topiramate 50 mg oral tablet: 1 tab(s) orally 2 times a day  traMADol 50 mg oral tablet: 3 tab(s) orally 2 times a day, As Needed

## 2022-10-08 LAB
ANION GAP SERPL CALC-SCNC: 12 MMOL/L — SIGNIFICANT CHANGE UP (ref 5–17)
BASOPHILS # BLD AUTO: 0.09 K/UL — SIGNIFICANT CHANGE UP (ref 0–0.2)
BASOPHILS NFR BLD AUTO: 1 % — SIGNIFICANT CHANGE UP (ref 0–2)
BUN SERPL-MCNC: 15 MG/DL — SIGNIFICANT CHANGE UP (ref 8–20)
CALCIUM SERPL-MCNC: 7.8 MG/DL — LOW (ref 8.4–10.5)
CHLORIDE SERPL-SCNC: 108 MMOL/L — HIGH (ref 98–107)
CO2 SERPL-SCNC: 18 MMOL/L — LOW (ref 22–29)
CREAT SERPL-MCNC: 0.69 MG/DL — SIGNIFICANT CHANGE UP (ref 0.5–1.3)
EGFR: 101 ML/MIN/1.73M2 — SIGNIFICANT CHANGE UP
EOSINOPHIL # BLD AUTO: 0.39 K/UL — SIGNIFICANT CHANGE UP (ref 0–0.5)
EOSINOPHIL NFR BLD AUTO: 4.4 % — SIGNIFICANT CHANGE UP (ref 0–6)
GLUCOSE SERPL-MCNC: 119 MG/DL — HIGH (ref 70–99)
HCT VFR BLD CALC: 30.3 % — LOW (ref 39–50)
HGB BLD-MCNC: 10.6 G/DL — LOW (ref 13–17)
IMM GRANULOCYTES NFR BLD AUTO: 0.6 % — SIGNIFICANT CHANGE UP (ref 0–0.9)
LYMPHOCYTES # BLD AUTO: 1.66 K/UL — SIGNIFICANT CHANGE UP (ref 1–3.3)
LYMPHOCYTES # BLD AUTO: 18.6 % — SIGNIFICANT CHANGE UP (ref 13–44)
MCHC RBC-ENTMCNC: 33.1 PG — SIGNIFICANT CHANGE UP (ref 27–34)
MCHC RBC-ENTMCNC: 35 GM/DL — SIGNIFICANT CHANGE UP (ref 32–36)
MCV RBC AUTO: 94.7 FL — SIGNIFICANT CHANGE UP (ref 80–100)
MONOCYTES # BLD AUTO: 0.7 K/UL — SIGNIFICANT CHANGE UP (ref 0–0.9)
MONOCYTES NFR BLD AUTO: 7.8 % — SIGNIFICANT CHANGE UP (ref 2–14)
NEUTROPHILS # BLD AUTO: 6.05 K/UL — SIGNIFICANT CHANGE UP (ref 1.8–7.4)
NEUTROPHILS NFR BLD AUTO: 67.6 % — SIGNIFICANT CHANGE UP (ref 43–77)
PLATELET # BLD AUTO: 95 K/UL — LOW (ref 150–400)
POTASSIUM SERPL-MCNC: 3.8 MMOL/L — SIGNIFICANT CHANGE UP (ref 3.5–5.3)
POTASSIUM SERPL-SCNC: 3.8 MMOL/L — SIGNIFICANT CHANGE UP (ref 3.5–5.3)
RBC # BLD: 3.2 M/UL — LOW (ref 4.2–5.8)
RBC # FLD: 17.5 % — HIGH (ref 10.3–14.5)
SODIUM SERPL-SCNC: 138 MMOL/L — SIGNIFICANT CHANGE UP (ref 135–145)
WBC # BLD: 8.94 K/UL — SIGNIFICANT CHANGE UP (ref 3.8–10.5)
WBC # FLD AUTO: 8.94 K/UL — SIGNIFICANT CHANGE UP (ref 3.8–10.5)

## 2022-10-08 PROCEDURE — 99232 SBSQ HOSP IP/OBS MODERATE 35: CPT

## 2022-10-08 RX ORDER — HYDROMORPHONE HYDROCHLORIDE 2 MG/ML
2 INJECTION INTRAMUSCULAR; INTRAVENOUS; SUBCUTANEOUS EVERY 8 HOURS
Refills: 0 | Status: DISCONTINUED | OUTPATIENT
Start: 2022-10-08 | End: 2022-10-09

## 2022-10-08 RX ORDER — TRAMADOL HYDROCHLORIDE 50 MG/1
25 TABLET ORAL EVERY 4 HOURS
Refills: 0 | Status: DISCONTINUED | OUTPATIENT
Start: 2022-10-08 | End: 2022-10-13

## 2022-10-08 RX ORDER — HYDROMORPHONE HYDROCHLORIDE 2 MG/ML
2 INJECTION INTRAMUSCULAR; INTRAVENOUS; SUBCUTANEOUS ONCE
Refills: 0 | Status: DISCONTINUED | OUTPATIENT
Start: 2022-10-08 | End: 2022-10-08

## 2022-10-08 RX ORDER — TRAMADOL HYDROCHLORIDE 50 MG/1
50 TABLET ORAL EVERY 4 HOURS
Refills: 0 | Status: DISCONTINUED | OUTPATIENT
Start: 2022-10-08 | End: 2022-10-13

## 2022-10-08 RX ADMIN — Medication 100 MILLIGRAM(S): at 05:16

## 2022-10-08 RX ADMIN — HYDROMORPHONE HYDROCHLORIDE 2 MILLIGRAM(S): 2 INJECTION INTRAMUSCULAR; INTRAVENOUS; SUBCUTANEOUS at 11:49

## 2022-10-08 RX ADMIN — HYDROMORPHONE HYDROCHLORIDE 2 MILLIGRAM(S): 2 INJECTION INTRAMUSCULAR; INTRAVENOUS; SUBCUTANEOUS at 01:36

## 2022-10-08 RX ADMIN — SODIUM CHLORIDE 100 MILLILITER(S): 9 INJECTION INTRAMUSCULAR; INTRAVENOUS; SUBCUTANEOUS at 10:49

## 2022-10-08 RX ADMIN — Medication 100 MILLIGRAM(S): at 10:54

## 2022-10-08 RX ADMIN — SENNA PLUS 2 TABLET(S): 8.6 TABLET ORAL at 21:22

## 2022-10-08 RX ADMIN — Medication 975 MILLIGRAM(S): at 21:21

## 2022-10-08 RX ADMIN — GABAPENTIN 1200 MILLIGRAM(S): 400 CAPSULE ORAL at 17:15

## 2022-10-08 RX ADMIN — HYDROMORPHONE HYDROCHLORIDE 2 MILLIGRAM(S): 2 INJECTION INTRAMUSCULAR; INTRAVENOUS; SUBCUTANEOUS at 18:56

## 2022-10-08 RX ADMIN — HYDROMORPHONE HYDROCHLORIDE 2 MILLIGRAM(S): 2 INJECTION INTRAMUSCULAR; INTRAVENOUS; SUBCUTANEOUS at 19:56

## 2022-10-08 RX ADMIN — METHOCARBAMOL 1000 MILLIGRAM(S): 500 TABLET, FILM COATED ORAL at 10:49

## 2022-10-08 RX ADMIN — GABAPENTIN 1200 MILLIGRAM(S): 400 CAPSULE ORAL at 05:17

## 2022-10-08 RX ADMIN — Medication 975 MILLIGRAM(S): at 13:06

## 2022-10-08 RX ADMIN — METHOCARBAMOL 1000 MILLIGRAM(S): 500 TABLET, FILM COATED ORAL at 00:28

## 2022-10-08 RX ADMIN — BENZOCAINE AND MENTHOL 1 LOZENGE: 5; 1 LIQUID ORAL at 10:50

## 2022-10-08 RX ADMIN — APIXABAN 5 MILLIGRAM(S): 2.5 TABLET, FILM COATED ORAL at 05:16

## 2022-10-08 RX ADMIN — Medication 975 MILLIGRAM(S): at 05:16

## 2022-10-08 RX ADMIN — Medication 100 MILLIGRAM(S): at 21:23

## 2022-10-08 RX ADMIN — SODIUM CHLORIDE 100 MILLILITER(S): 9 INJECTION INTRAMUSCULAR; INTRAVENOUS; SUBCUTANEOUS at 13:06

## 2022-10-08 RX ADMIN — Medication 975 MILLIGRAM(S): at 22:21

## 2022-10-08 RX ADMIN — LISINOPRIL 10 MILLIGRAM(S): 2.5 TABLET ORAL at 05:17

## 2022-10-08 RX ADMIN — Medication 975 MILLIGRAM(S): at 14:06

## 2022-10-08 RX ADMIN — POLYETHYLENE GLYCOL 3350 17 GRAM(S): 17 POWDER, FOR SOLUTION ORAL at 21:23

## 2022-10-08 RX ADMIN — APIXABAN 5 MILLIGRAM(S): 2.5 TABLET, FILM COATED ORAL at 17:15

## 2022-10-08 RX ADMIN — SIMVASTATIN 20 MILLIGRAM(S): 20 TABLET, FILM COATED ORAL at 21:21

## 2022-10-08 RX ADMIN — HYDROMORPHONE HYDROCHLORIDE 2 MILLIGRAM(S): 2 INJECTION INTRAMUSCULAR; INTRAVENOUS; SUBCUTANEOUS at 01:06

## 2022-10-08 RX ADMIN — HYDROMORPHONE HYDROCHLORIDE 2 MILLIGRAM(S): 2 INJECTION INTRAMUSCULAR; INTRAVENOUS; SUBCUTANEOUS at 10:49

## 2022-10-08 RX ADMIN — HYDROMORPHONE HYDROCHLORIDE 2 MILLIGRAM(S): 2 INJECTION INTRAMUSCULAR; INTRAVENOUS; SUBCUTANEOUS at 23:43

## 2022-10-08 RX ADMIN — HYDROMORPHONE HYDROCHLORIDE 2 MILLIGRAM(S): 2 INJECTION INTRAMUSCULAR; INTRAVENOUS; SUBCUTANEOUS at 23:58

## 2022-10-08 NOTE — PROGRESS NOTE ADULT - SUBJECTIVE AND OBJECTIVE BOX
SAMANTA ELENA    [id number]    History: Patient is status post T10-pelvis, TLIF, L3-S1 lami/ fusion on 10/6, POD#2.  Patient is alert this morning. Answering questions and following commands appropriately.  Patient is comfortable.  The patient's pain is controlled using the prescribed pain medications. The patient will participating in physical therapy. Denies nausea, vomiting, chest pain, shortness of breath, abdominal pain or fever. No new complaints.                              10.6   8.94  )-----------( 95       ( 08 Oct 2022 07:14 )             30.3     10-08    138  |  108<H>  |  15.0  ----------------------------<  119<H>  3.8   |  18.0<L>  |  0.69    Ca    7.8<L>      08 Oct 2022 07:14  Phos  4.7     10-07  Mg     1.7     10-07    TPro  4.3<L>  /  Alb  3.0<L>  /  TBili  0.3<L>  /  DBili  x   /  AST  54<H>  /  ALT  30  /  AlkPhos  43  10-07      MEDICATIONS  (STANDING):  acetaminophen     Tablet .. 975 milliGRAM(s) Oral every 8 hours  albumin human  5% IVPB 250 milliLiter(s) IV Intermittent every 2 hours  apixaban 5 milliGRAM(s) Oral two times a day  ceFAZolin   IVPB 2000 milliGRAM(s) IV Intermittent every 8 hours  gabapentin 1200 milliGRAM(s) Oral two times a day  lisinopril 10 milliGRAM(s) Oral daily  polyethylene glycol 3350 17 Gram(s) Oral at bedtime  senna 2 Tablet(s) Oral at bedtime  simvastatin 20 milliGRAM(s) Oral at bedtime  sodium chloride 0.9%. 1000 milliLiter(s) (100 mL/Hr) IV Continuous <Continuous>    MEDICATIONS  (PRN):  aluminum hydroxide/magnesium hydroxide/simethicone Suspension 30 milliLiter(s) Oral every 12 hours PRN Indigestion  benzocaine 15 mG/menthol 3.6 mG Lozenge 1 Lozenge Oral every 4 hours PRN Sore Throat  bisacodyl 5 milliGRAM(s) Oral every 12 hours PRN Constipation  famotidine    Tablet 20 milliGRAM(s) Oral every 12 hours PRN Dyspepsia  HYDROmorphone   Tablet 2 milliGRAM(s) Oral every 8 hours PRN Severe Pain (7 - 10)  magnesium hydroxide Suspension 30 milliLiter(s) Oral every 12 hours PRN Constipation  methocarbamol 1000 milliGRAM(s) Oral every 6 hours PRN Muscle Spasm  naloxone Injectable 0.1 milliGRAM(s) IV Push every 3 minutes PRN For ANY of the following changes in patient status:  A. RR LESS THAN 10 breaths per minute, B. Oxygen saturation LESS THAN 90%, C. Sedation score of 6  ondansetron Injectable 4 milliGRAM(s) IV Push every 6 hours PRN Nausea  ondansetron Injectable 4 milliGRAM(s) IV Push every 6 hours PRN Nausea and/or Vomiting  traMADol 25 milliGRAM(s) Oral every 4 hours PRN Mild Pain (1 - 3)  traMADol 50 milliGRAM(s) Oral every 4 hours PRN Moderate Pain (4 - 6)      Physical exam: The lumbar dressing is dry and intact. Small area of dried bloody draining noted at proximal incision. 2 drains remain in place.   No erythema is noted. No blistering. No ecchymosis. The calf is supple nontender.  No calf tenderness. Sensation to light touch is grossly intact distally.  Motor function distally is 5/5. No foot drop. 2+ dorsalis pedis pulse. Capillary refill is less than 2 seconds. No cyanosis.  Drain output   Drain 1: 205cc  Drain 2:  50 cc  Primary Orthopedic Assessment:  • s/pT10-pelvis, TLIF, L3-S1 lami/ fusion on 10/6, POD#2.   Secondary  Orthopedic Assessment(s):   •     Secondary  Medical Assessment(s):   •     Plan:   • DVT prophylaxis as prescribed, including use of compression devices and ankle pumps  • Continue physical therapy  • Weightbearing as tolerated with assistance of a walker  • Incentive spirometry encouraged  • Pain control as clinically indicated per pain management  • Discharge planning – anticipated discharge is Home once medically optimized.

## 2022-10-08 NOTE — PROGRESS NOTE ADULT - SUBJECTIVE AND OBJECTIVE BOX
HPI:  Patient is a 66 y/o M with Hx of chronic pain, due to severe degenerative disc disease, prior h/o narcotic addiction, H/O PE on Eliquis, HLD, HTN, asthma, cognitive impairment ( follows with Neurologist Karl Dhaliwal) depression, remote H/O afib, migraine, recent COVID 8/22. Patient is now S/P T10- Pelvis Transforaminal Lumbar interbody fusion, posterior spine fusion, Laminectomy L3-L5 by dr. Beto Holm on 10/6/22, POD #2. Patient awake, alert, and answering questions appropriately.     Interval Hx:  Patient was seen during rounds earlier today.    Patient reports pain to be mild to moderately controlled on current medications.   Pain progressively got worse during the night.  He is currently reporting severe pain. Last dose of Dilaudid 2mg was at 1800.    Vital Signs Last 24 Hrs:  T(C): 37 (08 Oct 2022 17:49), Max: 37.2 (08 Oct 2022 05:15)  T(F): 98.6 (08 Oct 2022 17:49), Max: 98.9 (08 Oct 2022 05:15)  HR: 103 (08 Oct 2022 17:49) (87 - 103)  BP: 157/84 (08 Oct 2022 17:49) (135/78 - 159/89)  BP(mean): --  RR: 18 (08 Oct 2022 17:49) (18 - 18)  SpO2: 97% (08 Oct 2022 17:49) (95% - 97%)  Parameters below as of 08 Oct 2022 17:49  Patient On (Oxygen Delivery Method): room air      Assessment and Recommendations:   · Assessment	  Patient is a 66 y/o M with Hx of chronic pain on tramadol prn and gabapentin 1200mg bid, due to severe degenerative disc disease, prior h/o narcotic addiction, H/O PE on Eliquis, HLD, HTN, asthma, cognitive impairment ( follows with Neurologist Karl Dhaliwal) depression, remote H/O afib, migraine, recent COVID 8/22. Patient is now S/P T10- Pelvis Transforaminal Lumbar interbody fusion, posterior spine fusion, Laminectomy L3-L5 by dr. Beto Holm on 10/6/22, POD #2.  Post-op course complicated by ams, which partner states is common. Patient is currently awake, alert, and answering questions appropriately. Patient states that his pain is mild to moderately controlled on current medications and that Dilaudid has been helpful in alleviating some of the pain.   Due to the hx of opioid abuse, partner requesting no opioids on discharge.      Of note, the patient's pain progressively got worse during the night and is now reporting severe pain. Last dose of Dilaudid 2mg po was at 1800 on 10/8/2022, next dose will be due in another 2 hours, at 0200. Will give Dilaudid 2mg po x 1 dose now.     · Recommendations   - Continue robaxin 1000mg po qid PRN muscle spasms   - Continue Dilaudid 2mg po q8h prn severe pain     - Continue tramadol 25mg po q4h prn mild pain   - Continue tramadol 50mg po q4h prn moderate pain.    - Continue gabapentin 1200mg po bid    - Hold medications for sedation or unstable vitals.     - Dilaudid 2mg po x 1 dose now for severe BTP (already ordered)   - If pain continues to be not well controlled, recommend changing Dilaudid 2mg po to q6h prn severe pain.       Thank you for this referral. Will continue to follow. Please call pain management with any questions. [phone]

## 2022-10-08 NOTE — PROGRESS NOTE ADULT - SUBJECTIVE AND OBJECTIVE BOX
Chief Complaint:    HPI:  67 year old male  with mid to lower back pain that intermittently radiates into RT lower extremity and right hip, had treatment with chiropractic therapy, epidural injections, radio frequency ablation have only provided temporary relief. Treatment with Gabapentin and Tramadol as prescribed have provided temporary relief also,  Patient has continued with at home exercises/stretches as best tolerated. No numbness/tingling sensation to lower extremities b/l. Now he is scheduled for a T10- Pelvis Transforaminal Lumbar interbody fusion, posterior spine fusion, Laminectomy L3-L5 by dr. Beto Holm on 10/6/22.  Covid vaccine series completed, card in chart, (Moderna X4) he was tested positive for Covid last month(August)  but no PCR test results with him, will repeat the test today, if positive no need for further testing needed if negative will go for the test again 3 days prior to surgery. Medical, Neuro and cardiac clearance pending      (21 Sep 2022 09:21)      PAST MEDICAL & SURGICAL HISTORY:  Hypertension      Lesion of brain  ? patient not sure exactly what it is he said &quot;something with my whitematter&quot; i have migraines due to it.      Migraine      High cholesterol      COPD, mild      Asthma  mild, well controlled not on any meds      Pulmonary embolism  On Eliquis      History of hip replacement  B/L twice, total 4      History of hand surgery  right wrist X10      History of prostate surgery      H/O laminectomy      H/O foot surgery  right hammer toe          FAMILY HISTORY:  FH: heart attack (Mother)    FH: prostate cancer (Father)        SOCIAL HISTORY:  [ ] Denies Smoking, Alcohol, or Drug Use    Allergies    No Known Allergies    Intolerances        PAIN MEDICATIONS:  acetaminophen     Tablet .. 975 milliGRAM(s) Oral every 8 hours  gabapentin 1200 milliGRAM(s) Oral two times a day  HYDROmorphone   Tablet 2 milliGRAM(s) Oral every 8 hours PRN  methocarbamol 1000 milliGRAM(s) Oral every 6 hours PRN  ondansetron Injectable 4 milliGRAM(s) IV Push every 6 hours PRN  ondansetron Injectable 4 milliGRAM(s) IV Push every 6 hours PRN  traMADol 25 milliGRAM(s) Oral every 4 hours PRN  traMADol 50 milliGRAM(s) Oral every 4 hours PRN    Heme:  apixaban 5 milliGRAM(s) Oral two times a day    Antibiotics:  ceFAZolin   IVPB 2000 milliGRAM(s) IV Intermittent every 8 hours    Cardiovascular:  lisinopril 10 milliGRAM(s) Oral daily    GI:  aluminum hydroxide/magnesium hydroxide/simethicone Suspension 30 milliLiter(s) Oral every 12 hours PRN  bisacodyl 5 milliGRAM(s) Oral every 12 hours PRN  famotidine    Tablet 20 milliGRAM(s) Oral every 12 hours PRN  magnesium hydroxide Suspension 30 milliLiter(s) Oral every 12 hours PRN  polyethylene glycol 3350 17 Gram(s) Oral at bedtime  senna 2 Tablet(s) Oral at bedtime    Endocrine:  simvastatin 20 milliGRAM(s) Oral at bedtime    All Other Medications:  albumin human  5% IVPB 250 milliLiter(s) IV Intermittent every 2 hours  benzocaine 15 mG/menthol 3.6 mG Lozenge 1 Lozenge Oral every 4 hours PRN  naloxone Injectable 0.1 milliGRAM(s) IV Push every 3 minutes PRN  sodium chloride 0.9%. 1000 milliLiter(s) IV Continuous <Continuous>      REVIEW OF SYSTEMS:    CONSTITUTIONAL: No fever, weight loss, or fatigue  EYES: No eye pain, visual disturbances, or discharge  ENMT:  No difficulty hearing, tinnitus, vertigo; No sinus or throat pain  NECK: No pain or stiffness  BREASTS: No pain, masses, or nipple discharge  RESPIRATORY: No cough, wheezing, chills or hemoptysis; No shortness of breath  CARDIOVASCULAR: No chest pain, palpitations, dizziness, or leg swelling  GASTROINTESTINAL: No abdominal or epigastric pain. No nausea, vomiting, or hematemesis; No diarrhea or constipation. No melena or hematochezia.  GENITOURINARY: No dysuria, frequency, hematuria, or incontinence  NEUROLOGICAL: No headaches, memory loss, loss of strength, numbness, or tremors  SKIN: No itching, burning, rashes, or lesions   LYMPH NODES: No enlarged glands  ENDOCRINE: No heat or cold intolerance; No hair loss  MUSCULOSKELETAL: No joint pain or swelling; No muscle, back, or extremity pain  PSYCHIATRIC: No depression, anxiety, mood swings, or difficulty sleeping  HEME/LYMPH: No easy bruising, or bleeding gums  ALLERY AND IMMUNOLOGIC: No hives or eczema      Vital Signs Last 24 Hrs  T(C): 36.8 (08 Oct 2022 11:01), Max: 37.2 (08 Oct 2022 05:15)  T(F): 98.2 (08 Oct 2022 11:01), Max: 98.9 (08 Oct 2022 05:15)  HR: 87 (08 Oct 2022 11:01) (87 - 97)  BP: 135/78 (08 Oct 2022 11:01) (122/64 - 159/89)  BP(mean): --  RR: 18 (08 Oct 2022 11:01) (18 - 19)  SpO2: 96% (08 Oct 2022 11:01) (94% - 96%)    Parameters below as of 08 Oct 2022 11:01  Patient On (Oxygen Delivery Method): room air        PAIN SCORE:         SCALE USED: (1-10 VNRS)             PHYSICAL EXAM:    GENERAL: NAD, well-groomed, well-developed  HEAD:  Atraumatic, Normocephalic  EYES: EOMI, PERRLA, conjunctiva and sclera clear  ENMT: No tonsillar erythema, exudates, or enlargement; Moist mucous membranes, Good dentition, No lesions  NECK: Supple, No JVD, Normal thyroid  NERVOUS SYSTEM:  Alert & Oriented X3, Good concentration; Motor Strength 5/5 B/L upper and lower extremities; DTRs 2+ intact and symmetric  CHEST/LUNG: Clear to percussion bilaterally; No rales, rhonchi, wheezing, or rubs  HEART: Regular rate and rhythm; No murmurs, rubs, or gallops  ABDOMEN: Soft, Nontender, Nondistended; Bowel sounds present  EXTREMITIES:  2+ Peripheral Pulses, No clubbing, cyanosis, or edema  LYMPH: No lymphadenopathy noted  SKIN: No rashes or lesions        LABS:                          10.6   8.94  )-----------( 95       ( 08 Oct 2022 07:14 )             30.3     10-08    138  |  108<H>  |  15.0  ----------------------------<  119<H>  3.8   |  18.0<L>  |  0.69    Ca    7.8<L>      08 Oct 2022 07:14  Phos  4.7     10-07  Mg     1.7     10-07    TPro  4.3<L>  /  Alb  3.0<L>  /  TBili  0.3<L>  /  DBili  x   /  AST  54<H>  /  ALT  30  /  AlkPhos  43  10-07          RADIOLOGY:    Drug Screen:            [ ]  [place]  Reviewed and Copied to Chart Chief Complaint:    HPI:  Patient is a 68 y/o M with Hx of chronic pain, due to severe degenerative disc disease, prior h/o narcotic addiction, H/O PE on Eliquis, HLD, HTN, asthma, cognitive impairment ( follows with Neurologist Karl Dhaliwal) depression, remote H/O afib, migraine, recent COVID 8/22.    Patient is now S/P T10- Pelvis Transforaminal Lumbar interbody fusion, posterior spine fusion, Laminectomy L3-L5 by dr. Beto Holm on 10/6/22, POD #2. Patient awake, alert, and answering questions appropriately.     Interval Hx:  Patient seen during rounds  Patient reports pain to be mild to moderately controlled on current medications  Patient denies sedation with current medications      PAST MEDICAL & SURGICAL HISTORY:  Hypertension      Lesion of brain  ? patient not sure exactly what it is he said &quot;something with my whitematter&quot; i have migraines due to it.      Migraine      High cholesterol      COPD, mild      Asthma  mild, well controlled not on any meds      Pulmonary embolism  On Eliquis      History of hip replacement  B/L twice, total 4      History of hand surgery  right wrist X10      History of prostate surgery      H/O laminectomy      H/O foot surgery  right hammer toe          FAMILY HISTORY:  FH: heart attack (Mother)    FH: prostate cancer (Father)        SOCIAL HISTORY:  [ ] Denies Smoking, Alcohol, or Drug Use    Allergies    No Known Allergies    Intolerances        PAIN MEDICATIONS:  acetaminophen     Tablet .. 975 milliGRAM(s) Oral every 8 hours  gabapentin 1200 milliGRAM(s) Oral two times a day  HYDROmorphone   Tablet 2 milliGRAM(s) Oral every 8 hours PRN  methocarbamol 1000 milliGRAM(s) Oral every 6 hours PRN  ondansetron Injectable 4 milliGRAM(s) IV Push every 6 hours PRN  ondansetron Injectable 4 milliGRAM(s) IV Push every 6 hours PRN  traMADol 25 milliGRAM(s) Oral every 4 hours PRN  traMADol 50 milliGRAM(s) Oral every 4 hours PRN    Heme:  apixaban 5 milliGRAM(s) Oral two times a day    Antibiotics:  ceFAZolin   IVPB 2000 milliGRAM(s) IV Intermittent every 8 hours    Cardiovascular:  lisinopril 10 milliGRAM(s) Oral daily    GI:  aluminum hydroxide/magnesium hydroxide/simethicone Suspension 30 milliLiter(s) Oral every 12 hours PRN  bisacodyl 5 milliGRAM(s) Oral every 12 hours PRN  famotidine    Tablet 20 milliGRAM(s) Oral every 12 hours PRN  magnesium hydroxide Suspension 30 milliLiter(s) Oral every 12 hours PRN  polyethylene glycol 3350 17 Gram(s) Oral at bedtime  senna 2 Tablet(s) Oral at bedtime    Endocrine:  simvastatin 20 milliGRAM(s) Oral at bedtime    All Other Medications:  albumin human  5% IVPB 250 milliLiter(s) IV Intermittent every 2 hours  benzocaine 15 mG/menthol 3.6 mG Lozenge 1 Lozenge Oral every 4 hours PRN  naloxone Injectable 0.1 milliGRAM(s) IV Push every 3 minutes PRN  sodium chloride 0.9%. 1000 milliLiter(s) IV Continuous <Continuous>      REVIEW OF SYSTEMS:    CONSTITUTIONAL: No fever, weight loss, or fatigue  EYES: No eye pain, visual disturbances, or discharge  ENMT:  No difficulty hearing, tinnitus, vertigo; No sinus or throat pain  NECK: No pain or stiffness  BREASTS: No pain, masses, or nipple discharge  RESPIRATORY: No cough, wheezing, chills or hemoptysis; No shortness of breath  CARDIOVASCULAR: No chest pain, palpitations, dizziness, or leg swelling  GASTROINTESTINAL: No abdominal or epigastric pain. No nausea, vomiting, or hematemesis; No diarrhea or constipation. No melena or hematochezia.  GENITOURINARY: No dysuria, frequency, hematuria, or incontinence  NEUROLOGICAL: No headaches, memory loss, loss of strength, numbness, or tremors  SKIN: No itching, burning, rashes, or lesions   LYMPH NODES: No enlarged glands  ENDOCRINE: No heat or cold intolerance; No hair loss  MUSCULOSKELETAL: No joint pain or swelling; No muscle, back, or extremity pain  PSYCHIATRIC: No depression, anxiety, mood swings, or difficulty sleeping  HEME/LYMPH: No easy bruising, or bleeding gums  ALLERY AND IMMUNOLOGIC: No hives or eczema      Vital Signs Last 24 Hrs  T(C): 36.8 (08 Oct 2022 11:01), Max: 37.2 (08 Oct 2022 05:15)  T(F): 98.2 (08 Oct 2022 11:01), Max: 98.9 (08 Oct 2022 05:15)  HR: 87 (08 Oct 2022 11:01) (87 - 97)  BP: 135/78 (08 Oct 2022 11:01) (122/64 - 159/89)  BP(mean): --  RR: 18 (08 Oct 2022 11:01) (18 - 19)  SpO2: 96% (08 Oct 2022 11:01) (94% - 96%)    Parameters below as of 08 Oct 2022 11:01  Patient On (Oxygen Delivery Method): room air        PAIN SCORE:         SCALE USED: (1-10 VNRS)             PHYSICAL EXAM:    GENERAL: NAD, well-groomed, well-developed  HEAD:  Atraumatic, Normocephalic  EYES: EOMI, PERRLA, conjunctiva and sclera clear  ENMT: No tonsillar erythema, exudates, or enlargement; Moist mucous membranes, Good dentition, No lesions  NECK: Supple, No JVD, Normal thyroid  NERVOUS SYSTEM:  Alert & Oriented X3, Good concentration; Motor Strength 5/5 B/L upper and lower extremities; DTRs 2+ intact and symmetric  CHEST/LUNG: Clear to percussion bilaterally; No rales, rhonchi, wheezing, or rubs  HEART: Regular rate and rhythm; No murmurs, rubs, or gallops  ABDOMEN: Soft, Nontender, Nondistended; Bowel sounds present  EXTREMITIES:  2+ Peripheral Pulses, No clubbing, cyanosis, or edema  LYMPH: No lymphadenopathy noted  SKIN: No rashes or lesions        LABS:                          10.6   8.94  )-----------( 95       ( 08 Oct 2022 07:14 )             30.3     10-08    138  |  108<H>  |  15.0  ----------------------------<  119<H>  3.8   |  18.0<L>  |  0.69    Ca    7.8<L>      08 Oct 2022 07:14  Phos  4.7     10-07  Mg     1.7     10-07    TPro  4.3<L>  /  Alb  3.0<L>  /  TBili  0.3<L>  /  DBili  x   /  AST  54<H>  /  ALT  30  /  AlkPhos  43  10-07          Pain Service   [phone]    Assessment and Recommendation:   · Assessment	  67M  chronic pain on tramadol prn and gabapentin 1200 bid  hx of opioid abuse  partner requesting no opioids on discharge  s/p thoracic to sacrum fusion 10/6    postop course complicated by ams, which partner states is common    Recommendations:   - Continue robaxin 1000mg po qid PRN muscle spasms   - Continue dilaudid 2mg po q8h prn severe pain     - Continue tramadol 25mg po q4h prn mild pain   - Continue tramadol 50mg po q4h prn moderate pain.    - Hold medications for sedation or unstable vitals.   HPI:  Patient is a 66 y/o M with Hx of chronic pain, due to severe degenerative disc disease, prior h/o narcotic addiction, H/O PE on Eliquis, HLD, HTN, asthma, cognitive impairment ( follows with Neurologist Karl Dhaliwal) depression, remote H/O afib, migraine, recent COVID 8/22. Patient is now S/P T10- Pelvis Transforaminal Lumbar interbody fusion, posterior spine fusion, Laminectomy L3-L5 by dr. Beto Holm on 10/6/22, POD #2. Patient awake, alert, and answering questions appropriately.     Interval Hx:  Patient seen during rounds  Patient reports pain to be mild to moderately controlled on current medications  Patient denies sedation with current medications      PAST MEDICAL & SURGICAL HISTORY:  Hypertension      Lesion of brain  ? patient not sure exactly what it is he said &quot;something with my whitematter&quot; i have migraines due to it.      Migraine      High cholesterol      COPD, mild      Asthma  mild, well controlled not on any meds      Pulmonary embolism  On Eliquis      History of hip replacement  B/L twice, total 4      History of hand surgery  right wrist X10      History of prostate surgery      H/O laminectomy      H/O foot surgery  right hammer toe          FAMILY HISTORY:  FH: heart attack (Mother)    FH: prostate cancer (Father)        SOCIAL HISTORY:  [ ] Denies Smoking, Alcohol, or Drug Use    Allergies    No Known Allergies    Intolerances        PAIN MEDICATIONS:  acetaminophen     Tablet .. 975 milliGRAM(s) Oral every 8 hours  gabapentin 1200 milliGRAM(s) Oral two times a day  HYDROmorphone   Tablet 2 milliGRAM(s) Oral every 8 hours PRN  methocarbamol 1000 milliGRAM(s) Oral every 6 hours PRN  ondansetron Injectable 4 milliGRAM(s) IV Push every 6 hours PRN  ondansetron Injectable 4 milliGRAM(s) IV Push every 6 hours PRN  traMADol 25 milliGRAM(s) Oral every 4 hours PRN  traMADol 50 milliGRAM(s) Oral every 4 hours PRN    Heme:  apixaban 5 milliGRAM(s) Oral two times a day    Antibiotics:  ceFAZolin   IVPB 2000 milliGRAM(s) IV Intermittent every 8 hours    Cardiovascular:  lisinopril 10 milliGRAM(s) Oral daily    GI:  aluminum hydroxide/magnesium hydroxide/simethicone Suspension 30 milliLiter(s) Oral every 12 hours PRN  bisacodyl 5 milliGRAM(s) Oral every 12 hours PRN  famotidine    Tablet 20 milliGRAM(s) Oral every 12 hours PRN  magnesium hydroxide Suspension 30 milliLiter(s) Oral every 12 hours PRN  polyethylene glycol 3350 17 Gram(s) Oral at bedtime  senna 2 Tablet(s) Oral at bedtime    Endocrine:  simvastatin 20 milliGRAM(s) Oral at bedtime    All Other Medications:  albumin human  5% IVPB 250 milliLiter(s) IV Intermittent every 2 hours  benzocaine 15 mG/menthol 3.6 mG Lozenge 1 Lozenge Oral every 4 hours PRN  naloxone Injectable 0.1 milliGRAM(s) IV Push every 3 minutes PRN  sodium chloride 0.9%. 1000 milliLiter(s) IV Continuous <Continuous>        Vital Signs Last 24 Hrs  T(C): 36.8 (08 Oct 2022 11:01), Max: 37.2 (08 Oct 2022 05:15)  T(F): 98.2 (08 Oct 2022 11:01), Max: 98.9 (08 Oct 2022 05:15)  HR: 87 (08 Oct 2022 11:01) (87 - 97)  BP: 135/78 (08 Oct 2022 11:01) (122/64 - 159/89)  BP(mean): --  RR: 18 (08 Oct 2022 11:01) (18 - 19)  SpO2: 96% (08 Oct 2022 11:01) (94% - 96%)    Parameters below as of 08 Oct 2022 11:01  Patient On (Oxygen Delivery Method): room air        PAIN SCORE:   6      SCALE USED: (1-10 VNRS)        LABS:                          10.6   8.94  )-----------( 95       ( 08 Oct 2022 07:14 )             30.3     10-08    138  |  108<H>  |  15.0  ----------------------------<  119<H>  3.8   |  18.0<L>  |  0.69    Ca    7.8<L>      08 Oct 2022 07:14  Phos  4.7     10-07  Mg     1.7     10-07    TPro  4.3<L>  /  Alb  3.0<L>  /  TBili  0.3<L>  /  DBili  x   /  AST  54<H>  /  ALT  30  /  AlkPhos  43  10-07        Assessment and Recommendation:   · Assessment	  Patient is a 66 y/o M with Hx of chronic pain on tramadol prn and gabapentin 1200mg bid, due to severe degenerative disc disease, prior h/o narcotic addiction, H/O PE on Eliquis, HLD, HTN, asthma, cognitive impairment ( follows with Neurologist Karl Alvarado [place]) depression, remote H/O afib, migraine, recent COVID 8/22. Patient is now S/P T10- Pelvis Transforaminal Lumbar interbody fusion, posterior spine fusion, Laminectomy L3-L5 by dr. Beto Holm on 10/6/22, POD #2.  Post-op course complicated by ams, which partner states is common.    Patient is currently awake, alert, and answering questions appropriately. Patient states that his pain is mild to moderately controlled on current medications and that Dilaudid has been helpful in alleviating some of the pain.   Due to the hx of opioid abuse, partner requesting no opioids on discharge.    Recommendations:   - Continue robaxin 1000mg po qid PRN muscle spasms   - Continue dilaudid 2mg po q8h prn severe pain     - Continue tramadol 25mg po q4h prn mild pain   - Continue tramadol 50mg po q4h prn moderate pain.    - Continue gabapentin 1200mg po bid    - Hold medications for sedation or unstable vitals.    Thank you for this referral. Will continue to follow. Please call pain management with any questions. [phone]

## 2022-10-08 NOTE — PROGRESS NOTE ADULT - SUBJECTIVE AND OBJECTIVE BOX
SAMANTA PARKER    [id number]    67y      Male    Patient is a 67y old  Male who presents with a chief complaint of lumbar stenosis (08 Oct 2022 11:29)      INTERVAL HPI/OVERNIGHT EVENTS:      Patient has pain in his back, not well controlled with pain meds, denies fever, chills, chest pain.   REVIEW OF SYSTEMS:    CONSTITUTIONAL: No fever, fatigue  RESPIRATORY: No cough, No shortness of breath  CARDIOVASCULAR: No chest pain, palpitations  GASTROINTESTINAL: No abdominal, No nausea, vomiting  NEUROLOGICAL: No headaches,  loss of strength.  MISCELLANEOUS: back pain       Vital Signs Last 24 Hrs  T(C): 36.8 (08 Oct 2022 11:01), Max: 37.2 (08 Oct 2022 05:15)  T(F): 98.2 (08 Oct 2022 11:01), Max: 98.9 (08 Oct 2022 05:15)  HR: 87 (08 Oct 2022 11:01) (87 - 97)  BP: 135/78 (08 Oct 2022 11:01) (106/62 - 159/89)  RR: 18 (08 Oct 2022 11:01) (18 - 19)  SpO2: 96% (08 Oct 2022 11:01) (94% - 96%)    Parameters below as of 08 Oct 2022 11:01  Patient On (Oxygen Delivery Method): room air        PHYSICAL EXAM:    GENERAL: Middle age male looking uncomfortable   HEENT: PERRL, +EOMI  NECK: soft, Supple, No JVD  CHEST/LUNG: Clear to auscultate bilaterally; No wheezing  HEART: S1S2+, Regular rate and rhythm; No murmurs  ABDOMEN: Soft, Nontender, Nondistended; Bowel sounds present  EXTREMITIES:  1+ Peripheral Pulses, No edema  SKIN: No rashes or lesions  NEURO: AAOX3  Back: lower back with clean dressings on, has drain in place      LABS:                        10.6   8.94  )-----------( 95       ( 08 Oct 2022 07:14 )             30.3     10-08    138  |  108<H>  |  15.0  ----------------------------<  119<H>  3.8   |  18.0<L>  |  0.69    Ca    7.8<L>      08 Oct 2022 07:14  Phos  4.7     10-07  Mg     1.7     10-07    TPro  4.3<L>  /  Alb  3.0<L>  /  TBili  0.3<L>  /  DBili  x   /  AST  54<H>  /  ALT  30  /  AlkPhos  43  10-07            I&O's Summary    07 Oct 2022 07:01  -  08 Oct 2022 07:00  --------------------------------------------------------  IN: 240 mL / OUT: 4525 mL / NET: -4285 mL    08 Oct 2022 07:01  -  08 Oct 2022 15:57  --------------------------------------------------------  IN: 0 mL / OUT: 120 mL / NET: -120 mL        MEDICATIONS  (STANDING):  acetaminophen     Tablet .. 975 milliGRAM(s) Oral every 8 hours  albumin human  5% IVPB 250 milliLiter(s) IV Intermittent every 2 hours  apixaban 5 milliGRAM(s) Oral two times a day  ceFAZolin   IVPB 2000 milliGRAM(s) IV Intermittent every 8 hours  gabapentin 1200 milliGRAM(s) Oral two times a day  lisinopril 10 milliGRAM(s) Oral daily  polyethylene glycol 3350 17 Gram(s) Oral at bedtime  senna 2 Tablet(s) Oral at bedtime  simvastatin 20 milliGRAM(s) Oral at bedtime  sodium chloride 0.9%. 1000 milliLiter(s) (100 mL/Hr) IV Continuous <Continuous>    MEDICATIONS  (PRN):  aluminum hydroxide/magnesium hydroxide/simethicone Suspension 30 milliLiter(s) Oral every 12 hours PRN Indigestion  benzocaine 15 mG/menthol 3.6 mG Lozenge 1 Lozenge Oral every 4 hours PRN Sore Throat  bisacodyl 5 milliGRAM(s) Oral every 12 hours PRN Constipation  famotidine    Tablet 20 milliGRAM(s) Oral every 12 hours PRN Dyspepsia  HYDROmorphone   Tablet 2 milliGRAM(s) Oral every 8 hours PRN Severe Pain (7 - 10)  magnesium hydroxide Suspension 30 milliLiter(s) Oral every 12 hours PRN Constipation  methocarbamol 1000 milliGRAM(s) Oral every 6 hours PRN Muscle Spasm  naloxone Injectable 0.1 milliGRAM(s) IV Push every 3 minutes PRN For ANY of the following changes in patient status:  A. RR LESS THAN 10 breaths per minute, B. Oxygen saturation LESS THAN 90%, C. Sedation score of 6  ondansetron Injectable 4 milliGRAM(s) IV Push every 6 hours PRN Nausea  ondansetron Injectable 4 milliGRAM(s) IV Push every 6 hours PRN Nausea and/or Vomiting  traMADol 25 milliGRAM(s) Oral every 4 hours PRN Mild Pain (1 - 3)  traMADol 50 milliGRAM(s) Oral every 4 hours PRN Moderate Pain (4 - 6)

## 2022-10-09 LAB
ANION GAP SERPL CALC-SCNC: 10 MMOL/L — SIGNIFICANT CHANGE UP (ref 5–17)
BASOPHILS # BLD AUTO: 0.05 K/UL — SIGNIFICANT CHANGE UP (ref 0–0.2)
BASOPHILS NFR BLD AUTO: 0.6 % — SIGNIFICANT CHANGE UP (ref 0–2)
BUN SERPL-MCNC: 7.7 MG/DL — LOW (ref 8–20)
CALCIUM SERPL-MCNC: 7.8 MG/DL — LOW (ref 8.4–10.5)
CHLORIDE SERPL-SCNC: 109 MMOL/L — HIGH (ref 98–107)
CO2 SERPL-SCNC: 20 MMOL/L — LOW (ref 22–29)
CREAT SERPL-MCNC: 0.54 MG/DL — SIGNIFICANT CHANGE UP (ref 0.5–1.3)
EGFR: 109 ML/MIN/1.73M2 — SIGNIFICANT CHANGE UP
EOSINOPHIL # BLD AUTO: 0.46 K/UL — SIGNIFICANT CHANGE UP (ref 0–0.5)
EOSINOPHIL NFR BLD AUTO: 5.7 % — SIGNIFICANT CHANGE UP (ref 0–6)
GLUCOSE SERPL-MCNC: 116 MG/DL — HIGH (ref 70–99)
HCT VFR BLD CALC: 24.9 % — LOW (ref 39–50)
HGB BLD-MCNC: 8.7 G/DL — LOW (ref 13–17)
IMM GRANULOCYTES NFR BLD AUTO: 0.6 % — SIGNIFICANT CHANGE UP (ref 0–0.9)
LYMPHOCYTES # BLD AUTO: 1.52 K/UL — SIGNIFICANT CHANGE UP (ref 1–3.3)
LYMPHOCYTES # BLD AUTO: 18.8 % — SIGNIFICANT CHANGE UP (ref 13–44)
MCHC RBC-ENTMCNC: 32.5 PG — SIGNIFICANT CHANGE UP (ref 27–34)
MCHC RBC-ENTMCNC: 34.9 GM/DL — SIGNIFICANT CHANGE UP (ref 32–36)
MCV RBC AUTO: 92.9 FL — SIGNIFICANT CHANGE UP (ref 80–100)
MONOCYTES # BLD AUTO: 0.57 K/UL — SIGNIFICANT CHANGE UP (ref 0–0.9)
MONOCYTES NFR BLD AUTO: 7.1 % — SIGNIFICANT CHANGE UP (ref 2–14)
NEUTROPHILS # BLD AUTO: 5.42 K/UL — SIGNIFICANT CHANGE UP (ref 1.8–7.4)
NEUTROPHILS NFR BLD AUTO: 67.2 % — SIGNIFICANT CHANGE UP (ref 43–77)
PLATELET # BLD AUTO: 98 K/UL — LOW (ref 150–400)
POTASSIUM SERPL-MCNC: 3.6 MMOL/L — SIGNIFICANT CHANGE UP (ref 3.5–5.3)
POTASSIUM SERPL-SCNC: 3.6 MMOL/L — SIGNIFICANT CHANGE UP (ref 3.5–5.3)
RBC # BLD: 2.68 M/UL — LOW (ref 4.2–5.8)
RBC # FLD: 16.5 % — HIGH (ref 10.3–14.5)
SODIUM SERPL-SCNC: 139 MMOL/L — SIGNIFICANT CHANGE UP (ref 135–145)
WBC # BLD: 8.07 K/UL — SIGNIFICANT CHANGE UP (ref 3.8–10.5)
WBC # FLD AUTO: 8.07 K/UL — SIGNIFICANT CHANGE UP (ref 3.8–10.5)

## 2022-10-09 PROCEDURE — 99232 SBSQ HOSP IP/OBS MODERATE 35: CPT

## 2022-10-09 RX ORDER — INFLUENZA VIRUS VACCINE 15; 15; 15; 15 UG/.5ML; UG/.5ML; UG/.5ML; UG/.5ML
0.7 SUSPENSION INTRAMUSCULAR ONCE
Refills: 0 | Status: DISCONTINUED | OUTPATIENT
Start: 2022-10-09 | End: 2022-10-13

## 2022-10-09 RX ORDER — HYDROMORPHONE HYDROCHLORIDE 2 MG/ML
2 INJECTION INTRAMUSCULAR; INTRAVENOUS; SUBCUTANEOUS EVERY 6 HOURS
Refills: 0 | Status: DISCONTINUED | OUTPATIENT
Start: 2022-10-09 | End: 2022-10-10

## 2022-10-09 RX ORDER — HYDROMORPHONE HYDROCHLORIDE 2 MG/ML
2 INJECTION INTRAMUSCULAR; INTRAVENOUS; SUBCUTANEOUS ONCE
Refills: 0 | Status: DISCONTINUED | OUTPATIENT
Start: 2022-10-09 | End: 2022-10-09

## 2022-10-09 RX ADMIN — APIXABAN 5 MILLIGRAM(S): 2.5 TABLET, FILM COATED ORAL at 05:06

## 2022-10-09 RX ADMIN — METHOCARBAMOL 1000 MILLIGRAM(S): 500 TABLET, FILM COATED ORAL at 04:27

## 2022-10-09 RX ADMIN — Medication 975 MILLIGRAM(S): at 05:07

## 2022-10-09 RX ADMIN — SENNA PLUS 2 TABLET(S): 8.6 TABLET ORAL at 22:41

## 2022-10-09 RX ADMIN — METHOCARBAMOL 1000 MILLIGRAM(S): 500 TABLET, FILM COATED ORAL at 17:18

## 2022-10-09 RX ADMIN — SODIUM CHLORIDE 100 MILLILITER(S): 9 INJECTION INTRAMUSCULAR; INTRAVENOUS; SUBCUTANEOUS at 03:12

## 2022-10-09 RX ADMIN — Medication 100 MILLIGRAM(S): at 05:08

## 2022-10-09 RX ADMIN — Medication 100 MILLIGRAM(S): at 22:36

## 2022-10-09 RX ADMIN — HYDROMORPHONE HYDROCHLORIDE 2 MILLIGRAM(S): 2 INJECTION INTRAMUSCULAR; INTRAVENOUS; SUBCUTANEOUS at 10:56

## 2022-10-09 RX ADMIN — HYDROMORPHONE HYDROCHLORIDE 2 MILLIGRAM(S): 2 INJECTION INTRAMUSCULAR; INTRAVENOUS; SUBCUTANEOUS at 16:17

## 2022-10-09 RX ADMIN — Medication 100 MILLIGRAM(S): at 11:55

## 2022-10-09 RX ADMIN — HYDROMORPHONE HYDROCHLORIDE 2 MILLIGRAM(S): 2 INJECTION INTRAMUSCULAR; INTRAVENOUS; SUBCUTANEOUS at 06:41

## 2022-10-09 RX ADMIN — HYDROMORPHONE HYDROCHLORIDE 2 MILLIGRAM(S): 2 INJECTION INTRAMUSCULAR; INTRAVENOUS; SUBCUTANEOUS at 23:40

## 2022-10-09 RX ADMIN — SIMVASTATIN 20 MILLIGRAM(S): 20 TABLET, FILM COATED ORAL at 22:40

## 2022-10-09 RX ADMIN — HYDROMORPHONE HYDROCHLORIDE 2 MILLIGRAM(S): 2 INJECTION INTRAMUSCULAR; INTRAVENOUS; SUBCUTANEOUS at 04:07

## 2022-10-09 RX ADMIN — Medication 975 MILLIGRAM(S): at 22:38

## 2022-10-09 RX ADMIN — Medication 975 MILLIGRAM(S): at 16:16

## 2022-10-09 RX ADMIN — POLYETHYLENE GLYCOL 3350 17 GRAM(S): 17 POWDER, FOR SOLUTION ORAL at 23:44

## 2022-10-09 RX ADMIN — Medication 975 MILLIGRAM(S): at 23:38

## 2022-10-09 RX ADMIN — GABAPENTIN 1200 MILLIGRAM(S): 400 CAPSULE ORAL at 05:06

## 2022-10-09 RX ADMIN — GABAPENTIN 1200 MILLIGRAM(S): 400 CAPSULE ORAL at 17:17

## 2022-10-09 RX ADMIN — HYDROMORPHONE HYDROCHLORIDE 2 MILLIGRAM(S): 2 INJECTION INTRAMUSCULAR; INTRAVENOUS; SUBCUTANEOUS at 22:40

## 2022-10-09 RX ADMIN — HYDROMORPHONE HYDROCHLORIDE 2 MILLIGRAM(S): 2 INJECTION INTRAMUSCULAR; INTRAVENOUS; SUBCUTANEOUS at 07:50

## 2022-10-09 RX ADMIN — Medication 975 MILLIGRAM(S): at 06:07

## 2022-10-09 RX ADMIN — HYDROMORPHONE HYDROCHLORIDE 2 MILLIGRAM(S): 2 INJECTION INTRAMUSCULAR; INTRAVENOUS; SUBCUTANEOUS at 03:07

## 2022-10-09 RX ADMIN — Medication 30 MILLILITER(S): at 11:55

## 2022-10-09 RX ADMIN — HYDROMORPHONE HYDROCHLORIDE 2 MILLIGRAM(S): 2 INJECTION INTRAMUSCULAR; INTRAVENOUS; SUBCUTANEOUS at 17:16

## 2022-10-09 RX ADMIN — APIXABAN 5 MILLIGRAM(S): 2.5 TABLET, FILM COATED ORAL at 17:18

## 2022-10-09 RX ADMIN — Medication 975 MILLIGRAM(S): at 17:16

## 2022-10-09 RX ADMIN — HYDROMORPHONE HYDROCHLORIDE 2 MILLIGRAM(S): 2 INJECTION INTRAMUSCULAR; INTRAVENOUS; SUBCUTANEOUS at 09:56

## 2022-10-09 RX ADMIN — LISINOPRIL 10 MILLIGRAM(S): 2.5 TABLET ORAL at 05:08

## 2022-10-09 NOTE — PROGRESS NOTE ADULT - SUBJECTIVE AND OBJECTIVE BOX
SAMANTA PARKER    [id number]    67y      Male    Patient is a 67y old  Male who presents with a chief complaint of spine surgery (09 Oct 2022 08:28)      INTERVAL HPI/OVERNIGHT EVENTS:    Patient has pain in his back some what better, denies fever, chills, chest pain.     REVIEW OF SYSTEMS:    CONSTITUTIONAL: No fever, fatigue  RESPIRATORY: No cough, No shortness of breath  CARDIOVASCULAR: No chest pain, palpitations  GASTROINTESTINAL: No abdominal, No nausea, vomiting  NEUROLOGICAL: No headaches,  loss of strength.  MISCELLANEOUS: back pain is well controlled      Vital Signs Last 24 Hrs  T(C): 37.1 (09 Oct 2022 05:18), Max: 37.1 (09 Oct 2022 05:18)  T(F): 98.7 (09 Oct 2022 05:18), Max: 98.7 (09 Oct 2022 05:18)  HR: 97 (09 Oct 2022 05:18) (97 - 103)  BP: 153/85 (09 Oct 2022 05:18) (153/85 - 157/84)  RR: 18 (09 Oct 2022 05:18) (18 - 18)  SpO2: 95% (09 Oct 2022 05:18) (95% - 97%)    Parameters below as of 09 Oct 2022 05:18  Patient On (Oxygen Delivery Method): room air        PHYSICAL EXAM:    GENERAL: Middle age male looking uncomfortable   HEENT: PERRL, +EOMI  NECK: soft, Supple, No JVD  CHEST/LUNG: Clear to auscultate bilaterally; No wheezing  HEART: S1S2+, Regular rate and rhythm; No murmurs  ABDOMEN: Soft, Nontender, Nondistended; Bowel sounds present  EXTREMITIES:  1+ Peripheral Pulses, No edema  SKIN: No rashes or lesions  NEURO: AAOX3  Back: lower back with clean dressings on, has drain in place      LABS:                        8.7    8.07  )-----------( 98       ( 09 Oct 2022 07:36 )             24.9     10-09    139  |  109<H>  |  7.7<L>  ----------------------------<  116<H>  3.6   |  20.0<L>  |  0.54    Ca    7.8<L>      09 Oct 2022 07:36              I&O's Summary    08 Oct 2022 07:01  -  09 Oct 2022 07:00  --------------------------------------------------------  IN: 1200 mL / OUT: 3040 mL / NET: -1840 mL        MEDICATIONS  (STANDING):  acetaminophen     Tablet .. 975 milliGRAM(s) Oral every 8 hours  albumin human  5% IVPB 250 milliLiter(s) IV Intermittent every 2 hours  apixaban 5 milliGRAM(s) Oral two times a day  ceFAZolin   IVPB 2000 milliGRAM(s) IV Intermittent every 8 hours  gabapentin 1200 milliGRAM(s) Oral two times a day  lisinopril 10 milliGRAM(s) Oral daily  polyethylene glycol 3350 17 Gram(s) Oral at bedtime  senna 2 Tablet(s) Oral at bedtime  simvastatin 20 milliGRAM(s) Oral at bedtime  sodium chloride 0.9%. 1000 milliLiter(s) (100 mL/Hr) IV Continuous <Continuous>    MEDICATIONS  (PRN):  aluminum hydroxide/magnesium hydroxide/simethicone Suspension 30 milliLiter(s) Oral every 12 hours PRN Indigestion  benzocaine 15 mG/menthol 3.6 mG Lozenge 1 Lozenge Oral every 4 hours PRN Sore Throat  bisacodyl 5 milliGRAM(s) Oral every 12 hours PRN Constipation  famotidine    Tablet 20 milliGRAM(s) Oral every 12 hours PRN Dyspepsia  HYDROmorphone   Tablet 2 milliGRAM(s) Oral every 6 hours PRN Severe Pain (7 - 10)  magnesium hydroxide Suspension 30 milliLiter(s) Oral every 12 hours PRN Constipation  methocarbamol 1000 milliGRAM(s) Oral every 6 hours PRN Muscle Spasm  naloxone Injectable 0.1 milliGRAM(s) IV Push every 3 minutes PRN For ANY of the following changes in patient status:  A. RR LESS THAN 10 breaths per minute, B. Oxygen saturation LESS THAN 90%, C. Sedation score of 6  ondansetron Injectable 4 milliGRAM(s) IV Push every 6 hours PRN Nausea  ondansetron Injectable 4 milliGRAM(s) IV Push every 6 hours PRN Nausea and/or Vomiting  traMADol 25 milliGRAM(s) Oral every 4 hours PRN Mild Pain (1 - 3)  traMADol 50 milliGRAM(s) Oral every 4 hours PRN Moderate Pain (4 - 6)

## 2022-10-09 NOTE — PROGRESS NOTE ADULT - SUBJECTIVE AND OBJECTIVE BOX
Pt Name: SAMANTA PARKER    MRN: [id number]      Patient is a being followed for T10-Pelvis, TLIF, L3-s1/ lami fusion  patient is doing much better today.  Alert and oriented. following commands   pain moderately controlled with current regimen from pain management       PAST MEDICAL & SURGICAL HISTORY:  PAST MEDICAL & SURGICAL HISTORY:  Hypertension      Lesion of brain  ? patient not sure exactly what it is he said &quot;something with my whitematter&quot; i have migraines due to it.      Migraine      High cholesterol      COPD, mild      Asthma  mild, well controlled not on any meds      Pulmonary embolism  On Eliquis      History of hip replacement  B/L twice, total 4      History of hand surgery  right wrist X10      History of prostate surgery      H/O laminectomy      H/O foot surgery  right hammer toe          Allergies: No Known Allergies      Medications: acetaminophen     Tablet .. 975 milliGRAM(s) Oral every 8 hours  albumin human  5% IVPB 250 milliLiter(s) IV Intermittent every 2 hours  aluminum hydroxide/magnesium hydroxide/simethicone Suspension 30 milliLiter(s) Oral every 12 hours PRN  apixaban 5 milliGRAM(s) Oral two times a day  benzocaine 15 mG/menthol 3.6 mG Lozenge 1 Lozenge Oral every 4 hours PRN  bisacodyl 5 milliGRAM(s) Oral every 12 hours PRN  ceFAZolin   IVPB 2000 milliGRAM(s) IV Intermittent every 8 hours  famotidine    Tablet 20 milliGRAM(s) Oral every 12 hours PRN  gabapentin 1200 milliGRAM(s) Oral two times a day  HYDROmorphone   Tablet 2 milliGRAM(s) Oral every 6 hours PRN  lisinopril 10 milliGRAM(s) Oral daily  magnesium hydroxide Suspension 30 milliLiter(s) Oral every 12 hours PRN  methocarbamol 1000 milliGRAM(s) Oral every 6 hours PRN  naloxone Injectable 0.1 milliGRAM(s) IV Push every 3 minutes PRN  ondansetron Injectable 4 milliGRAM(s) IV Push every 6 hours PRN  ondansetron Injectable 4 milliGRAM(s) IV Push every 6 hours PRN  polyethylene glycol 3350 17 Gram(s) Oral at bedtime  senna 2 Tablet(s) Oral at bedtime  simvastatin 20 milliGRAM(s) Oral at bedtime  sodium chloride 0.9%. 1000 milliLiter(s) IV Continuous <Continuous>  traMADol 25 milliGRAM(s) Oral every 4 hours PRN  traMADol 50 milliGRAM(s) Oral every 4 hours PRN                            8.7    8.07  )-----------( 98       ( 09 Oct 2022 07:36 )             24.9     10-09    139  |  109<H>  |  7.7<L>  ----------------------------<  116<H>  3.6   |  20.0<L>  |  0.54    Ca    7.8<L>      09 Oct 2022 07:36        PHYSICAL EXAM:    Vital Signs Last 24 Hrs  T(C): 37.2 (09 Oct 2022 11:06), Max: 37.2 (09 Oct 2022 11:06)  T(F): 98.9 (09 Oct 2022 11:06), Max: 98.9 (09 Oct 2022 11:06)  HR: 86 (09 Oct 2022 11:06) (86 - 103)  BP: 128/80 (09 Oct 2022 11:06) (128/80 - 157/84)  BP(mean): --  RR: 18 (09 Oct 2022 11:06) (18 - 18)  SpO2: 94% (09 Oct 2022 11:06) (94% - 97%)    Parameters below as of 09 Oct 2022 11:06  Patient On (Oxygen Delivery Method): room air      Daily     Daily     Appearance: Alert, responsive, in no acute distress.    Neurological: Sensation is grossly intact to light touch. 5/5 motor function of all extremities. No focal deficits or weaknesses found.    Skin: no rash on visible skin. Skin is clean, dry and intact. No active  bleeding. No abrasions. No ulcerations.    Vascular: 2+ distal pulses. Cap refill < 2 sec. No signs of venous   insufficiency   or stasis. No extremity ulcerations. No cyanosis.   motor and sensory remain unchanged from previous exam.  A/P:  Pt is a  67y Male with hx of lumbar stenosis    PLAN:   * d/c singh  PT/ OOB to chair, WBAT  pan management to continue to follow  Drain #2 removed today, new dressings placed.   Will continue to monitor drain #1

## 2022-10-09 NOTE — PROGRESS NOTE ADULT - SUBJECTIVE AND OBJECTIVE BOX
Patient seen today POD#3 s/p psf T10-pelvis. Reports incisional back pain, no radicular pain. AVSS. Dressing intact, drain functional. Moves bilateral lower extremities to command with good strength.  Plan: Continue hemovac1, d/c hemovac 2. Continue scd's and Eliquis. Incentive spirometer. Pain control. OOB, ambulate with PT.

## 2022-10-10 PROCEDURE — 99232 SBSQ HOSP IP/OBS MODERATE 35: CPT

## 2022-10-10 RX ORDER — HYDROMORPHONE HYDROCHLORIDE 2 MG/ML
2 INJECTION INTRAMUSCULAR; INTRAVENOUS; SUBCUTANEOUS EVERY 4 HOURS
Refills: 0 | Status: DISCONTINUED | OUTPATIENT
Start: 2022-10-10 | End: 2022-10-13

## 2022-10-10 RX ORDER — MULTIVIT WITH MIN/MFOLATE/K2 340-15/3 G
1 POWDER (GRAM) ORAL ONCE
Refills: 0 | Status: DISCONTINUED | OUTPATIENT
Start: 2022-10-10 | End: 2022-10-11

## 2022-10-10 RX ADMIN — HYDROMORPHONE HYDROCHLORIDE 2 MILLIGRAM(S): 2 INJECTION INTRAMUSCULAR; INTRAVENOUS; SUBCUTANEOUS at 22:18

## 2022-10-10 RX ADMIN — SIMVASTATIN 20 MILLIGRAM(S): 20 TABLET, FILM COATED ORAL at 22:02

## 2022-10-10 RX ADMIN — MAGNESIUM HYDROXIDE 30 MILLILITER(S): 400 TABLET, CHEWABLE ORAL at 22:03

## 2022-10-10 RX ADMIN — Medication 975 MILLIGRAM(S): at 23:03

## 2022-10-10 RX ADMIN — SODIUM CHLORIDE 100 MILLILITER(S): 9 INJECTION INTRAMUSCULAR; INTRAVENOUS; SUBCUTANEOUS at 04:48

## 2022-10-10 RX ADMIN — HYDROMORPHONE HYDROCHLORIDE 2 MILLIGRAM(S): 2 INJECTION INTRAMUSCULAR; INTRAVENOUS; SUBCUTANEOUS at 10:49

## 2022-10-10 RX ADMIN — HYDROMORPHONE HYDROCHLORIDE 2 MILLIGRAM(S): 2 INJECTION INTRAMUSCULAR; INTRAVENOUS; SUBCUTANEOUS at 04:40

## 2022-10-10 RX ADMIN — HYDROMORPHONE HYDROCHLORIDE 2 MILLIGRAM(S): 2 INJECTION INTRAMUSCULAR; INTRAVENOUS; SUBCUTANEOUS at 11:45

## 2022-10-10 RX ADMIN — GABAPENTIN 1200 MILLIGRAM(S): 400 CAPSULE ORAL at 05:58

## 2022-10-10 RX ADMIN — Medication 100 MILLIGRAM(S): at 22:03

## 2022-10-10 RX ADMIN — POLYETHYLENE GLYCOL 3350 17 GRAM(S): 17 POWDER, FOR SOLUTION ORAL at 23:02

## 2022-10-10 RX ADMIN — LISINOPRIL 10 MILLIGRAM(S): 2.5 TABLET ORAL at 06:00

## 2022-10-10 RX ADMIN — Medication 975 MILLIGRAM(S): at 05:59

## 2022-10-10 RX ADMIN — HYDROMORPHONE HYDROCHLORIDE 2 MILLIGRAM(S): 2 INJECTION INTRAMUSCULAR; INTRAVENOUS; SUBCUTANEOUS at 05:40

## 2022-10-10 RX ADMIN — Medication 975 MILLIGRAM(S): at 15:40

## 2022-10-10 RX ADMIN — HYDROMORPHONE HYDROCHLORIDE 2 MILLIGRAM(S): 2 INJECTION INTRAMUSCULAR; INTRAVENOUS; SUBCUTANEOUS at 21:18

## 2022-10-10 RX ADMIN — Medication 100 MILLIGRAM(S): at 04:41

## 2022-10-10 RX ADMIN — Medication 975 MILLIGRAM(S): at 14:44

## 2022-10-10 RX ADMIN — Medication 975 MILLIGRAM(S): at 06:59

## 2022-10-10 RX ADMIN — Medication 100 MILLIGRAM(S): at 12:13

## 2022-10-10 RX ADMIN — GABAPENTIN 1200 MILLIGRAM(S): 400 CAPSULE ORAL at 17:02

## 2022-10-10 RX ADMIN — Medication 975 MILLIGRAM(S): at 22:03

## 2022-10-10 RX ADMIN — HYDROMORPHONE HYDROCHLORIDE 2 MILLIGRAM(S): 2 INJECTION INTRAMUSCULAR; INTRAVENOUS; SUBCUTANEOUS at 17:02

## 2022-10-10 RX ADMIN — APIXABAN 5 MILLIGRAM(S): 2.5 TABLET, FILM COATED ORAL at 17:03

## 2022-10-10 RX ADMIN — APIXABAN 5 MILLIGRAM(S): 2.5 TABLET, FILM COATED ORAL at 05:58

## 2022-10-10 RX ADMIN — SENNA PLUS 2 TABLET(S): 8.6 TABLET ORAL at 22:02

## 2022-10-10 NOTE — PROGRESS NOTE ADULT - SUBJECTIVE AND OBJECTIVE BOX
SAMANTA PARKER    [id number]    67y      Male    Patient is a 67y old  Male who presents with a chief complaint of lumbar stenosis (09 Oct 2022 11:48)      INTERVAL HPI/OVERNIGHT EVENTS:    Patient pain is better, denies fever, chills, chest pain, sob     REVIEW OF SYSTEMS:    CONSTITUTIONAL: No fever, fatigue  RESPIRATORY: No cough, No shortness of breath  CARDIOVASCULAR: No chest pain, palpitations  GASTROINTESTINAL: No abdominal, No nausea, vomiting  NEUROLOGICAL: No headaches,  loss of strength.  MISCELLANEOUS: back pain is well controlled       Vital Signs Last 24 Hrs  T(C): 36.9 (10 Oct 2022 05:00), Max: 37.2 (09 Oct 2022 11:06)  T(F): 98.5 (10 Oct 2022 05:00), Max: 98.9 (09 Oct 2022 11:06)  HR: 89 (10 Oct 2022 05:00) (86 - 99)  BP: 169/93 (10 Oct 2022 05:00) (128/80 - 169/93)  RR: 18 (10 Oct 2022 05:00) (18 - 18)  SpO2: 95% (10 Oct 2022 05:00) (94% - 96%)    Parameters below as of 10 Oct 2022 05:00  Patient On (Oxygen Delivery Method): room air        PHYSICAL EXAM:      GENERAL: Middle age male looking uncomfortable   HEENT: PERRL, +EOMI  NECK: soft, Supple, No JVD  CHEST/LUNG: Clear to auscultate bilaterally; No wheezing  HEART: S1S2+, Regular rate and rhythm; No murmurs  ABDOMEN: Soft, Nontender, Nondistended; Bowel sounds present  EXTREMITIES:  1+ Peripheral Pulses, No edema  SKIN: No rashes or lesions  NEURO: AAOX3  Back: lower back with clean dressings on, has drain in place.       LABS:                        8.7    8.07  )-----------( 98       ( 09 Oct 2022 07:36 )             24.9     10-09    139  |  109<H>  |  7.7<L>  ----------------------------<  116<H>  3.6   |  20.0<L>  |  0.54    Ca    7.8<L>      09 Oct 2022 07:36              I&O's Summary    09 Oct 2022 07:01  -  10 Oct 2022 07:00  --------------------------------------------------------  IN: 1000 mL / OUT: 2360 mL / NET: -1360 mL        MEDICATIONS  (STANDING):  acetaminophen     Tablet .. 975 milliGRAM(s) Oral every 8 hours  albumin human  5% IVPB 250 milliLiter(s) IV Intermittent every 2 hours  apixaban 5 milliGRAM(s) Oral two times a day  ceFAZolin   IVPB 2000 milliGRAM(s) IV Intermittent every 8 hours  gabapentin 1200 milliGRAM(s) Oral two times a day  influenza  Vaccine (HIGH DOSE) 0.7 milliLiter(s) IntraMuscular once  lisinopril 10 milliGRAM(s) Oral daily  polyethylene glycol 3350 17 Gram(s) Oral at bedtime  senna 2 Tablet(s) Oral at bedtime  simvastatin 20 milliGRAM(s) Oral at bedtime  sodium chloride 0.9%. 1000 milliLiter(s) (100 mL/Hr) IV Continuous <Continuous>    MEDICATIONS  (PRN):  aluminum hydroxide/magnesium hydroxide/simethicone Suspension 30 milliLiter(s) Oral every 12 hours PRN Indigestion  benzocaine 15 mG/menthol 3.6 mG Lozenge 1 Lozenge Oral every 4 hours PRN Sore Throat  bisacodyl 5 milliGRAM(s) Oral every 12 hours PRN Constipation  famotidine    Tablet 20 milliGRAM(s) Oral every 12 hours PRN Dyspepsia  HYDROmorphone   Tablet 2 milliGRAM(s) Oral every 6 hours PRN Severe Pain (7 - 10)  magnesium hydroxide Suspension 30 milliLiter(s) Oral every 12 hours PRN Constipation  methocarbamol 1000 milliGRAM(s) Oral every 6 hours PRN Muscle Spasm  naloxone Injectable 0.1 milliGRAM(s) IV Push every 3 minutes PRN For ANY of the following changes in patient status:  A. RR LESS THAN 10 breaths per minute, B. Oxygen saturation LESS THAN 90%, C. Sedation score of 6  ondansetron Injectable 4 milliGRAM(s) IV Push every 6 hours PRN Nausea  ondansetron Injectable 4 milliGRAM(s) IV Push every 6 hours PRN Nausea and/or Vomiting  traMADol 25 milliGRAM(s) Oral every 4 hours PRN Mild Pain (1 - 3)  traMADol 50 milliGRAM(s) Oral every 4 hours PRN Moderate Pain (4 - 6)

## 2022-10-10 NOTE — PROGRESS NOTE ADULT - SUBJECTIVE AND OBJECTIVE BOX
ORTHO-SPINE POST-OP PROGRESS NOTE:      [id number]    SAMANTA PARKER      PROCEDURE:    DOS:       SUBJECTIVE: 67y Patient seen and examined. Patient reports of moderate discomfort that is controlled by pain medications. Patient denies of acute sensory or motor changes.                             8.7    8.07  )-----------( 98       ( 09 Oct 2022 07:36 )             24.9               I&O's Detail    09 Oct 2022 07:01  -  10 Oct 2022 07:00  --------------------------------------------------------  IN:    sodium chloride 0.9%: 1000 mL  Total IN: 1000 mL    OUT:    Accordian (mL): 10 mL    Accordian (mL): 150 mL    Indwelling Catheter - Urethral (mL): 1300 mL    Voided (mL): 900 mL  Total OUT: 2360 mL    Total NET: -1360 mL            acetaminophen     Tablet .. 975 milliGRAM(s) Oral every 8 hours  albumin human  5% IVPB 250 milliLiter(s) IV Intermittent every 2 hours  aluminum hydroxide/magnesium hydroxide/simethicone Suspension 30 milliLiter(s) Oral every 12 hours PRN  apixaban 5 milliGRAM(s) Oral two times a day  benzocaine 15 mG/menthol 3.6 mG Lozenge 1 Lozenge Oral every 4 hours PRN  bisacodyl 5 milliGRAM(s) Oral every 12 hours PRN  ceFAZolin   IVPB 2000 milliGRAM(s) IV Intermittent every 8 hours  famotidine    Tablet 20 milliGRAM(s) Oral every 12 hours PRN  gabapentin 1200 milliGRAM(s) Oral two times a day  HYDROmorphone   Tablet 2 milliGRAM(s) Oral every 6 hours PRN  influenza  Vaccine (HIGH DOSE) 0.7 milliLiter(s) IntraMuscular once  lisinopril 10 milliGRAM(s) Oral daily  magnesium hydroxide Suspension 30 milliLiter(s) Oral every 12 hours PRN  methocarbamol 1000 milliGRAM(s) Oral every 6 hours PRN  naloxone Injectable 0.1 milliGRAM(s) IV Push every 3 minutes PRN  ondansetron Injectable 4 milliGRAM(s) IV Push every 6 hours PRN  ondansetron Injectable 4 milliGRAM(s) IV Push every 6 hours PRN  polyethylene glycol 3350 17 Gram(s) Oral at bedtime  senna 2 Tablet(s) Oral at bedtime  simvastatin 20 milliGRAM(s) Oral at bedtime  sodium chloride 0.9%. 1000 milliLiter(s) IV Continuous <Continuous>  traMADol 25 milliGRAM(s) Oral every 4 hours PRN  traMADol 50 milliGRAM(s) Oral every 4 hours PRN        T(C): 36.9 (10-10-22 @ 05:00), Max: 37.2 (10-09-22 @ 11:06)  HR: 89 (10-10-22 @ 05:00) (86 - 99)  BP: 169/93 (10-10-22 @ 05:00) (128/80 - 169/93)  RR: 18 (10-10-22 @ 05:00) (18 - 18)  SpO2: 95% (10-10-22 @ 05:00) (94% - 96%)  Wt(kg): --      PHYSICAL EXAM:     Constitutional: Alert, responsive, in no acute distress.     Physical exam: drains remain in place.   No erythema is noted. No blistering. No ecchymosis. The calf is supple nontender.  No calf tenderness. Sensation to light touch is grossly intact distally.  Motor function distally is 5/5. No foot drop. 2+ dorsalis pedis pulse. Capillary refill is less than 2 seconds. No cyanosis.    A/P :  70y Male S/P T10-pelvis TLIF, L3-S1 Lami POD#4     -  Pain control  -  DVT ppx: [x]SCDs   [x] Eliquis  -  PT and out of bed today  -  Weight bearing status: WBAT [X]          -  Dispo:[facility]  - recheck drain output at 2 pm

## 2022-10-10 NOTE — PROGRESS NOTE ADULT - SUBJECTIVE AND OBJECTIVE BOX
Interval Hx:  Patient seen during rounds  Patient reports pain to be mod controlled on current medications  good relief with dilaudid po at this dose just does not last 6 hours  Patient denies sedation with medications     Analgesic Dosing for past 24 hours reviewed as below:    acetaminophen     Tablet ..   975 milliGRAM(s) Oral (10-10-22 @ 05:59)   975 milliGRAM(s) Oral (10-09-22 @ 22:38)   975 milliGRAM(s) Oral (10-09-22 @ 16:16)    gabapentin   1200 milliGRAM(s) Oral (10-10-22 @ 05:58)   1200 milliGRAM(s) Oral (10-09-22 @ 17:17)    HYDROmorphone   Tablet   2 milliGRAM(s) Oral (10-10-22 @ 10:49)   2 milliGRAM(s) Oral (10-10-22 @ 04:40)   2 milliGRAM(s) Oral (10-09-22 @ 22:40)   2 milliGRAM(s) Oral (10-09-22 @ 16:17)    methocarbamol   1000 milliGRAM(s) Oral (10-09-22 @ 17:18)          T(C): 37 (10-10-22 @ 10:14), Max: 37.1 (10-09-22 @ 16:55)  HR: 80 (10-10-22 @ 10:14) (80 - 99)  BP: 165/95 (10-10-22 @ 10:14) (158/92 - 169/93)  RR: 17 (10-10-22 @ 10:14) (17 - 18)  SpO2: 93% (10-10-22 @ 10:14) (93% - 96%)      10-09-22 @ 07:01  -  10-10-22 @ 07:00  --------------------------------------------------------  IN: 1000 mL / OUT: 2360 mL / NET: -1360 mL        acetaminophen     Tablet .. 975 milliGRAM(s) Oral every 8 hours  albumin human  5% IVPB 250 milliLiter(s) IV Intermittent every 2 hours  aluminum hydroxide/magnesium hydroxide/simethicone Suspension 30 milliLiter(s) Oral every 12 hours PRN  apixaban 5 milliGRAM(s) Oral two times a day  benzocaine 15 mG/menthol 3.6 mG Lozenge 1 Lozenge Oral every 4 hours PRN  bisacodyl 5 milliGRAM(s) Oral every 12 hours PRN  ceFAZolin   IVPB 2000 milliGRAM(s) IV Intermittent every 8 hours  famotidine    Tablet 20 milliGRAM(s) Oral every 12 hours PRN  gabapentin 1200 milliGRAM(s) Oral two times a day  HYDROmorphone   Tablet 2 milliGRAM(s) Oral every 4 hours PRN  influenza  Vaccine (HIGH DOSE) 0.7 milliLiter(s) IntraMuscular once  lisinopril 10 milliGRAM(s) Oral daily  magnesium hydroxide Suspension 30 milliLiter(s) Oral every 12 hours PRN  methocarbamol 1000 milliGRAM(s) Oral every 6 hours PRN  naloxone Injectable 0.1 milliGRAM(s) IV Push every 3 minutes PRN  ondansetron Injectable 4 milliGRAM(s) IV Push every 6 hours PRN  ondansetron Injectable 4 milliGRAM(s) IV Push every 6 hours PRN  polyethylene glycol 3350 17 Gram(s) Oral at bedtime  senna 2 Tablet(s) Oral at bedtime  simvastatin 20 milliGRAM(s) Oral at bedtime  sodium chloride 0.9%. 1000 milliLiter(s) IV Continuous <Continuous>  traMADol 25 milliGRAM(s) Oral every 4 hours PRN  traMADol 50 milliGRAM(s) Oral every 4 hours PRN                          8.7    8.07  )-----------( 98       ( 09 Oct 2022 07:36 )             24.9     10-09    139  |  109<H>  |  7.7<L>  ----------------------------<  116<H>  3.6   |  20.0<L>  |  0.54    Ca    7.8<L>      09 Oct 2022 07:36            Pain Service   [phone]

## 2022-10-11 PROCEDURE — 99232 SBSQ HOSP IP/OBS MODERATE 35: CPT

## 2022-10-11 RX ADMIN — HYDROMORPHONE HYDROCHLORIDE 2 MILLIGRAM(S): 2 INJECTION INTRAMUSCULAR; INTRAVENOUS; SUBCUTANEOUS at 16:09

## 2022-10-11 RX ADMIN — HYDROMORPHONE HYDROCHLORIDE 2 MILLIGRAM(S): 2 INJECTION INTRAMUSCULAR; INTRAVENOUS; SUBCUTANEOUS at 15:09

## 2022-10-11 RX ADMIN — POLYETHYLENE GLYCOL 3350 17 GRAM(S): 17 POWDER, FOR SOLUTION ORAL at 21:12

## 2022-10-11 RX ADMIN — METHOCARBAMOL 1000 MILLIGRAM(S): 500 TABLET, FILM COATED ORAL at 09:53

## 2022-10-11 RX ADMIN — LISINOPRIL 10 MILLIGRAM(S): 2.5 TABLET ORAL at 06:11

## 2022-10-11 RX ADMIN — HYDROMORPHONE HYDROCHLORIDE 2 MILLIGRAM(S): 2 INJECTION INTRAMUSCULAR; INTRAVENOUS; SUBCUTANEOUS at 21:00

## 2022-10-11 RX ADMIN — HYDROMORPHONE HYDROCHLORIDE 2 MILLIGRAM(S): 2 INJECTION INTRAMUSCULAR; INTRAVENOUS; SUBCUTANEOUS at 11:29

## 2022-10-11 RX ADMIN — Medication 975 MILLIGRAM(S): at 13:37

## 2022-10-11 RX ADMIN — HYDROMORPHONE HYDROCHLORIDE 2 MILLIGRAM(S): 2 INJECTION INTRAMUSCULAR; INTRAVENOUS; SUBCUTANEOUS at 07:10

## 2022-10-11 RX ADMIN — Medication 100 MILLIGRAM(S): at 19:53

## 2022-10-11 RX ADMIN — HYDROMORPHONE HYDROCHLORIDE 2 MILLIGRAM(S): 2 INJECTION INTRAMUSCULAR; INTRAVENOUS; SUBCUTANEOUS at 10:29

## 2022-10-11 RX ADMIN — SIMVASTATIN 20 MILLIGRAM(S): 20 TABLET, FILM COATED ORAL at 21:12

## 2022-10-11 RX ADMIN — SODIUM CHLORIDE 100 MILLILITER(S): 9 INJECTION INTRAMUSCULAR; INTRAVENOUS; SUBCUTANEOUS at 06:13

## 2022-10-11 RX ADMIN — Medication 975 MILLIGRAM(S): at 22:12

## 2022-10-11 RX ADMIN — Medication 100 MILLIGRAM(S): at 13:36

## 2022-10-11 RX ADMIN — SODIUM CHLORIDE 100 MILLILITER(S): 9 INJECTION INTRAMUSCULAR; INTRAVENOUS; SUBCUTANEOUS at 19:52

## 2022-10-11 RX ADMIN — GABAPENTIN 1200 MILLIGRAM(S): 400 CAPSULE ORAL at 06:11

## 2022-10-11 RX ADMIN — Medication 975 MILLIGRAM(S): at 06:11

## 2022-10-11 RX ADMIN — HYDROMORPHONE HYDROCHLORIDE 2 MILLIGRAM(S): 2 INJECTION INTRAMUSCULAR; INTRAVENOUS; SUBCUTANEOUS at 01:48

## 2022-10-11 RX ADMIN — GABAPENTIN 1200 MILLIGRAM(S): 400 CAPSULE ORAL at 17:27

## 2022-10-11 RX ADMIN — HYDROMORPHONE HYDROCHLORIDE 2 MILLIGRAM(S): 2 INJECTION INTRAMUSCULAR; INTRAVENOUS; SUBCUTANEOUS at 19:53

## 2022-10-11 RX ADMIN — APIXABAN 5 MILLIGRAM(S): 2.5 TABLET, FILM COATED ORAL at 06:12

## 2022-10-11 RX ADMIN — HYDROMORPHONE HYDROCHLORIDE 2 MILLIGRAM(S): 2 INJECTION INTRAMUSCULAR; INTRAVENOUS; SUBCUTANEOUS at 06:10

## 2022-10-11 RX ADMIN — APIXABAN 5 MILLIGRAM(S): 2.5 TABLET, FILM COATED ORAL at 17:27

## 2022-10-11 RX ADMIN — Medication 975 MILLIGRAM(S): at 21:12

## 2022-10-11 RX ADMIN — Medication 975 MILLIGRAM(S): at 07:11

## 2022-10-11 RX ADMIN — Medication 100 MILLIGRAM(S): at 06:10

## 2022-10-11 RX ADMIN — HYDROMORPHONE HYDROCHLORIDE 2 MILLIGRAM(S): 2 INJECTION INTRAMUSCULAR; INTRAVENOUS; SUBCUTANEOUS at 02:48

## 2022-10-11 RX ADMIN — SENNA PLUS 2 TABLET(S): 8.6 TABLET ORAL at 21:12

## 2022-10-11 RX ADMIN — Medication 975 MILLIGRAM(S): at 14:37

## 2022-10-11 NOTE — PROGRESS NOTE ADULT - SUBJECTIVE AND OBJECTIVE BOX
Interval Hx:  Patient seen during rounds  Patient reports pain to be controlled on current medications  Patient denies sedation with medications     Analgesic Dosing for past 24 hours reviewed as below:    acetaminophen     Tablet ..   975 milliGRAM(s) Oral (10-11-22 @ 06:11)   975 milliGRAM(s) Oral (10-10-22 @ 22:03)   975 milliGRAM(s) Oral (10-10-22 @ 14:44)    gabapentin   1200 milliGRAM(s) Oral (10-11-22 @ 06:11)   1200 milliGRAM(s) Oral (10-10-22 @ 17:02)    HYDROmorphone   Tablet   2 milliGRAM(s) Oral (10-11-22 @ 10:29)   2 milliGRAM(s) Oral (10-11-22 @ 06:10)   2 milliGRAM(s) Oral (10-11-22 @ 01:48)   2 milliGRAM(s) Oral (10-10-22 @ 21:18)   2 milliGRAM(s) Oral (10-10-22 @ 17:02)    methocarbamol   1000 milliGRAM(s) Oral (10-11-22 @ 09:53)          T(C): 36.6 (10-11-22 @ 09:36), Max: 36.9 (10-10-22 @ 17:09)  HR: 91 (10-11-22 @ 09:36) (78 - 91)  BP: 156/92 (10-11-22 @ 09:36) (129/77 - 163/86)  RR: 17 (10-11-22 @ 09:36) (17 - 18)  SpO2: 95% (10-11-22 @ 09:36) (95% - 96%)      10-10-22 @ 07:01  -  10-11-22 @ 07:00  --------------------------------------------------------  IN: 0 mL / OUT: 1915 mL / NET: -1915 mL        acetaminophen     Tablet .. 975 milliGRAM(s) Oral every 8 hours  albumin human  5% IVPB 250 milliLiter(s) IV Intermittent every 2 hours  aluminum hydroxide/magnesium hydroxide/simethicone Suspension 30 milliLiter(s) Oral every 12 hours PRN  apixaban 5 milliGRAM(s) Oral two times a day  benzocaine 15 mG/menthol 3.6 mG Lozenge 1 Lozenge Oral every 4 hours PRN  bisacodyl 5 milliGRAM(s) Oral every 12 hours PRN  ceFAZolin   IVPB 2000 milliGRAM(s) IV Intermittent every 8 hours  famotidine    Tablet 20 milliGRAM(s) Oral every 12 hours PRN  gabapentin 1200 milliGRAM(s) Oral two times a day  HYDROmorphone   Tablet 2 milliGRAM(s) Oral every 4 hours PRN  influenza  Vaccine (HIGH DOSE) 0.7 milliLiter(s) IntraMuscular once  lisinopril 10 milliGRAM(s) Oral daily  magnesium hydroxide Suspension 30 milliLiter(s) Oral every 12 hours PRN  methocarbamol 1000 milliGRAM(s) Oral every 6 hours PRN  naloxone Injectable 0.1 milliGRAM(s) IV Push every 3 minutes PRN  ondansetron Injectable 4 milliGRAM(s) IV Push every 6 hours PRN  ondansetron Injectable 4 milliGRAM(s) IV Push every 6 hours PRN  polyethylene glycol 3350 17 Gram(s) Oral at bedtime  senna 2 Tablet(s) Oral at bedtime  simvastatin 20 milliGRAM(s) Oral at bedtime  sodium chloride 0.9%. 1000 milliLiter(s) IV Continuous <Continuous>  traMADol 25 milliGRAM(s) Oral every 4 hours PRN  traMADol 50 milliGRAM(s) Oral every 4 hours PRN                  Pain Service   [phone]

## 2022-10-11 NOTE — PROGRESS NOTE ADULT - SUBJECTIVE AND OBJECTIVE BOX
SAMANTA PARKER    [id number]    67y      Male    Patient is a 67y old  Male who presents with a chief complaint of lumbar stenosis (09 Oct 2022 11:48)      INTERVAL HPI/OVERNIGHT EVENTS:      Patients pain is better, denies fever, chills, chest pain, sob, nausea, vomiting     REVIEW OF SYSTEMS:    CONSTITUTIONAL: No fever, fatigue  RESPIRATORY: No cough, No shortness of breath  CARDIOVASCULAR: No chest pain, palpitations  GASTROINTESTINAL: No abdominal, No nausea, vomiting  NEUROLOGICAL: No headaches,  loss of strength.  MISCELLANEOUS: back pain is well controlled        Vital Signs Last 24 Hrs  T(C): 36.6 (11 Oct 2022 09:36), Max: 36.9 (10 Oct 2022 17:09)  T(F): 97.8 (11 Oct 2022 09:36), Max: 98.5 (10 Oct 2022 17:09)  HR: 91 (11 Oct 2022 09:36) (78 - 91)  BP: 156/92 (11 Oct 2022 09:36) (129/77 - 163/86)  RR: 17 (11 Oct 2022 09:36) (17 - 18)  SpO2: 95% (11 Oct 2022 09:36) (95% - 96%)    Parameters below as of 11 Oct 2022 09:36  Patient On (Oxygen Delivery Method): room air        PHYSICAL EXAM:    GENERAL: Middle age male looking uncomfortable   HEENT: PERRL, +EOMI  NECK: soft, Supple, No JVD  CHEST/LUNG: Clear to auscultate bilaterally; No wheezing  HEART: S1S2+, Regular rate and rhythm; No murmurs  ABDOMEN: Soft, Nontender, Nondistended; Bowel sounds present  EXTREMITIES:  1+ Peripheral Pulses, No edema  SKIN: No rashes or lesions  NEURO: AAOX3  Back: lower back with clean dressings on, has drain in place.         10 Oct 2022 07:01  -  11 Oct 2022 07:00  --------------------------------------------------------  IN: 0 mL / OUT: 1915 mL / NET: -1915 mL        MEDICATIONS  (STANDING):  acetaminophen     Tablet .. 975 milliGRAM(s) Oral every 8 hours  albumin human  5% IVPB 250 milliLiter(s) IV Intermittent every 2 hours  apixaban 5 milliGRAM(s) Oral two times a day  ceFAZolin   IVPB 2000 milliGRAM(s) IV Intermittent every 8 hours  gabapentin 1200 milliGRAM(s) Oral two times a day  influenza  Vaccine (HIGH DOSE) 0.7 milliLiter(s) IntraMuscular once  lisinopril 10 milliGRAM(s) Oral daily  polyethylene glycol 3350 17 Gram(s) Oral at bedtime  senna 2 Tablet(s) Oral at bedtime  simvastatin 20 milliGRAM(s) Oral at bedtime  sodium chloride 0.9%. 1000 milliLiter(s) (100 mL/Hr) IV Continuous <Continuous>    MEDICATIONS  (PRN):  aluminum hydroxide/magnesium hydroxide/simethicone Suspension 30 milliLiter(s) Oral every 12 hours PRN Indigestion  benzocaine 15 mG/menthol 3.6 mG Lozenge 1 Lozenge Oral every 4 hours PRN Sore Throat  bisacodyl 5 milliGRAM(s) Oral every 12 hours PRN Constipation  famotidine    Tablet 20 milliGRAM(s) Oral every 12 hours PRN Dyspepsia  HYDROmorphone   Tablet 2 milliGRAM(s) Oral every 4 hours PRN Severe Pain (7 - 10)  magnesium hydroxide Suspension 30 milliLiter(s) Oral every 12 hours PRN Constipation  methocarbamol 1000 milliGRAM(s) Oral every 6 hours PRN Muscle Spasm  naloxone Injectable 0.1 milliGRAM(s) IV Push every 3 minutes PRN For ANY of the following changes in patient status:  A. RR LESS THAN 10 breaths per minute, B. Oxygen saturation LESS THAN 90%, C. Sedation score of 6  ondansetron Injectable 4 milliGRAM(s) IV Push every 6 hours PRN Nausea  ondansetron Injectable 4 milliGRAM(s) IV Push every 6 hours PRN Nausea and/or Vomiting  traMADol 25 milliGRAM(s) Oral every 4 hours PRN Mild Pain (1 - 3)  traMADol 50 milliGRAM(s) Oral every 4 hours PRN Moderate Pain (4 - 6)

## 2022-10-12 LAB — SARS-COV-2 RNA SPEC QL NAA+PROBE: SIGNIFICANT CHANGE UP

## 2022-10-12 PROCEDURE — 99232 SBSQ HOSP IP/OBS MODERATE 35: CPT

## 2022-10-12 RX ORDER — ACETAMINOPHEN 500 MG
3 TABLET ORAL
Qty: 0 | Refills: 0 | DISCHARGE
Start: 2022-10-12

## 2022-10-12 RX ORDER — INDOMETHACIN 50 MG
1 CAPSULE ORAL
Qty: 0 | Refills: 0 | DISCHARGE

## 2022-10-12 RX ORDER — SENNA PLUS 8.6 MG/1
2 TABLET ORAL
Qty: 0 | Refills: 0 | DISCHARGE
Start: 2022-10-12

## 2022-10-12 RX ORDER — IBUPROFEN 200 MG
1 TABLET ORAL
Qty: 0 | Refills: 0 | DISCHARGE

## 2022-10-12 RX ORDER — METHOCARBAMOL 500 MG/1
2 TABLET, FILM COATED ORAL
Qty: 0 | Refills: 0 | DISCHARGE
Start: 2022-10-12

## 2022-10-12 RX ADMIN — APIXABAN 5 MILLIGRAM(S): 2.5 TABLET, FILM COATED ORAL at 17:56

## 2022-10-12 RX ADMIN — HYDROMORPHONE HYDROCHLORIDE 2 MILLIGRAM(S): 2 INJECTION INTRAMUSCULAR; INTRAVENOUS; SUBCUTANEOUS at 08:43

## 2022-10-12 RX ADMIN — Medication 975 MILLIGRAM(S): at 07:39

## 2022-10-12 RX ADMIN — LISINOPRIL 10 MILLIGRAM(S): 2.5 TABLET ORAL at 06:58

## 2022-10-12 RX ADMIN — SENNA PLUS 2 TABLET(S): 8.6 TABLET ORAL at 21:29

## 2022-10-12 RX ADMIN — GABAPENTIN 1200 MILLIGRAM(S): 400 CAPSULE ORAL at 17:55

## 2022-10-12 RX ADMIN — HYDROMORPHONE HYDROCHLORIDE 2 MILLIGRAM(S): 2 INJECTION INTRAMUSCULAR; INTRAVENOUS; SUBCUTANEOUS at 14:11

## 2022-10-12 RX ADMIN — Medication 975 MILLIGRAM(S): at 15:12

## 2022-10-12 RX ADMIN — HYDROMORPHONE HYDROCHLORIDE 2 MILLIGRAM(S): 2 INJECTION INTRAMUSCULAR; INTRAVENOUS; SUBCUTANEOUS at 01:14

## 2022-10-12 RX ADMIN — HYDROMORPHONE HYDROCHLORIDE 2 MILLIGRAM(S): 2 INJECTION INTRAMUSCULAR; INTRAVENOUS; SUBCUTANEOUS at 00:14

## 2022-10-12 RX ADMIN — Medication 975 MILLIGRAM(S): at 21:29

## 2022-10-12 RX ADMIN — HYDROMORPHONE HYDROCHLORIDE 2 MILLIGRAM(S): 2 INJECTION INTRAMUSCULAR; INTRAVENOUS; SUBCUTANEOUS at 22:37

## 2022-10-12 RX ADMIN — HYDROMORPHONE HYDROCHLORIDE 2 MILLIGRAM(S): 2 INJECTION INTRAMUSCULAR; INTRAVENOUS; SUBCUTANEOUS at 23:30

## 2022-10-12 RX ADMIN — HYDROMORPHONE HYDROCHLORIDE 2 MILLIGRAM(S): 2 INJECTION INTRAMUSCULAR; INTRAVENOUS; SUBCUTANEOUS at 04:22

## 2022-10-12 RX ADMIN — SIMVASTATIN 20 MILLIGRAM(S): 20 TABLET, FILM COATED ORAL at 21:30

## 2022-10-12 RX ADMIN — POLYETHYLENE GLYCOL 3350 17 GRAM(S): 17 POWDER, FOR SOLUTION ORAL at 21:29

## 2022-10-12 RX ADMIN — Medication 100 MILLIGRAM(S): at 04:23

## 2022-10-12 RX ADMIN — HYDROMORPHONE HYDROCHLORIDE 2 MILLIGRAM(S): 2 INJECTION INTRAMUSCULAR; INTRAVENOUS; SUBCUTANEOUS at 18:44

## 2022-10-12 RX ADMIN — METHOCARBAMOL 1000 MILLIGRAM(S): 500 TABLET, FILM COATED ORAL at 07:06

## 2022-10-12 RX ADMIN — Medication 975 MILLIGRAM(S): at 06:58

## 2022-10-12 RX ADMIN — Medication 975 MILLIGRAM(S): at 14:12

## 2022-10-12 RX ADMIN — GABAPENTIN 1200 MILLIGRAM(S): 400 CAPSULE ORAL at 06:58

## 2022-10-12 RX ADMIN — HYDROMORPHONE HYDROCHLORIDE 2 MILLIGRAM(S): 2 INJECTION INTRAMUSCULAR; INTRAVENOUS; SUBCUTANEOUS at 05:22

## 2022-10-12 RX ADMIN — HYDROMORPHONE HYDROCHLORIDE 2 MILLIGRAM(S): 2 INJECTION INTRAMUSCULAR; INTRAVENOUS; SUBCUTANEOUS at 19:44

## 2022-10-12 RX ADMIN — HYDROMORPHONE HYDROCHLORIDE 2 MILLIGRAM(S): 2 INJECTION INTRAMUSCULAR; INTRAVENOUS; SUBCUTANEOUS at 15:12

## 2022-10-12 RX ADMIN — Medication 975 MILLIGRAM(S): at 22:10

## 2022-10-12 RX ADMIN — HYDROMORPHONE HYDROCHLORIDE 2 MILLIGRAM(S): 2 INJECTION INTRAMUSCULAR; INTRAVENOUS; SUBCUTANEOUS at 09:43

## 2022-10-12 RX ADMIN — APIXABAN 5 MILLIGRAM(S): 2.5 TABLET, FILM COATED ORAL at 06:59

## 2022-10-12 NOTE — PROGRESS NOTE ADULT - SUBJECTIVE AND OBJECTIVE BOX
SAMANTA PARKER    [id number]    POST OPERATIVE DAY #: 6  STATUS POST: T10-pelvis fusion, L3-S1 lami                       SUBJECTIVE:   Patient seen and examined resting comfortably, sitting up in chair.   Patient has been participating in PT.    Patient denies any acute motor sensory changes.   No new complaints  Awaiting TISHA placement    OBJECTIVE:     Vital Signs Last 24 Hrs  T(C): 36.9 (12 Oct 2022 08:10), Max: 37.2 (11 Oct 2022 21:00)  T(F): 98.5 (12 Oct 2022 08:10), Max: 98.9 (11 Oct 2022 21:00)  HR: 60 (12 Oct 2022 08:10) (60 - 100)  BP: 109/71 (12 Oct 2022 08:10) (109/71 - 170/88)  BP(mean): --  RR: 18 (12 Oct 2022 08:10) (17 - 18)  SpO2: 99% (12 Oct 2022 08:10) (95% - 99%)    Parameters below as of 12 Oct 2022 08:10  Patient On (Oxygen Delivery Method): room air    Constitutional: Alert, responsive, in no acute distress.   Abdominal: soft and supple non distended  Lymphatics: no pretibial pitting edema    Spine:          Dressing: C/D/I, dressing removed, Nylon sutures in place, Incision healing well         Drains: HV present x 1, 70cc output overnight- drain removed         New dry sterile dressing placed         Sensation: SILT           Motor exam:         [ ] Lower extremity                  HF(l2)    KE(l3)    TA(l4)   EHL(l5)     GS(s1)                                                 R        5/5        5/5        5/5      5/5          5/5                                               L         5/5        5/5       5/5       5/5          5/5                                                 Vascular: warm well perfused; capillary refill <3 seconds                                                        A/P :  67y Male S/P T10-pelvis fusion, L3-S1 lami     POD#6    -    Pain control  -    DVT ppx: eliquis  -    Continue Physical Therapy  -    Weight bearing status: WBAT   -    Dispo: TISHA

## 2022-10-13 ENCOUNTER — TRANSCRIPTION ENCOUNTER (OUTPATIENT)
Age: 67
End: 2022-10-13

## 2022-10-13 VITALS
HEART RATE: 73 BPM | DIASTOLIC BLOOD PRESSURE: 75 MMHG | RESPIRATION RATE: 18 BRPM | OXYGEN SATURATION: 95 % | SYSTOLIC BLOOD PRESSURE: 154 MMHG | TEMPERATURE: 98 F

## 2022-10-13 PROCEDURE — 99232 SBSQ HOSP IP/OBS MODERATE 35: CPT

## 2022-10-13 RX ADMIN — HYDROMORPHONE HYDROCHLORIDE 2 MILLIGRAM(S): 2 INJECTION INTRAMUSCULAR; INTRAVENOUS; SUBCUTANEOUS at 17:34

## 2022-10-13 RX ADMIN — Medication 975 MILLIGRAM(S): at 06:58

## 2022-10-13 RX ADMIN — Medication 975 MILLIGRAM(S): at 13:50

## 2022-10-13 RX ADMIN — HYDROMORPHONE HYDROCHLORIDE 2 MILLIGRAM(S): 2 INJECTION INTRAMUSCULAR; INTRAVENOUS; SUBCUTANEOUS at 13:51

## 2022-10-13 RX ADMIN — Medication 975 MILLIGRAM(S): at 13:06

## 2022-10-13 RX ADMIN — HYDROMORPHONE HYDROCHLORIDE 2 MILLIGRAM(S): 2 INJECTION INTRAMUSCULAR; INTRAVENOUS; SUBCUTANEOUS at 10:10

## 2022-10-13 RX ADMIN — GABAPENTIN 1200 MILLIGRAM(S): 400 CAPSULE ORAL at 06:31

## 2022-10-13 RX ADMIN — APIXABAN 5 MILLIGRAM(S): 2.5 TABLET, FILM COATED ORAL at 06:31

## 2022-10-13 RX ADMIN — HYDROMORPHONE HYDROCHLORIDE 2 MILLIGRAM(S): 2 INJECTION INTRAMUSCULAR; INTRAVENOUS; SUBCUTANEOUS at 04:24

## 2022-10-13 RX ADMIN — HYDROMORPHONE HYDROCHLORIDE 2 MILLIGRAM(S): 2 INJECTION INTRAMUSCULAR; INTRAVENOUS; SUBCUTANEOUS at 09:22

## 2022-10-13 RX ADMIN — FAMOTIDINE 20 MILLIGRAM(S): 10 INJECTION INTRAVENOUS at 06:32

## 2022-10-13 RX ADMIN — Medication 975 MILLIGRAM(S): at 06:31

## 2022-10-13 RX ADMIN — LISINOPRIL 10 MILLIGRAM(S): 2.5 TABLET ORAL at 06:32

## 2022-10-13 RX ADMIN — METHOCARBAMOL 1000 MILLIGRAM(S): 500 TABLET, FILM COATED ORAL at 06:32

## 2022-10-13 RX ADMIN — HYDROMORPHONE HYDROCHLORIDE 2 MILLIGRAM(S): 2 INJECTION INTRAMUSCULAR; INTRAVENOUS; SUBCUTANEOUS at 03:24

## 2022-10-13 NOTE — PROGRESS NOTE ADULT - ASSESSMENT
66 y/o M with Hx of chronic pain, due to severe degenerative disc disease, prior h/o narcotic addiction, H/O PE on Eliquis, HLD, HTN, asthma, cognitive impairment ( follows with Neurologist Karl Riveranybrook) depression, remote H/O afib, migraine, recent COVID 8/22, now S/P  T10- Pelvis Transforaminal Lumbar interbody fusion, posterior spine fusion, Laminectomy L3-L5 by dr. Beto Holm on 10/6/22, patient  has been lethargic since PACU. Lethargy presumed to be  2/2 intra-op ketamine/methadone. Has not improved for several hrs and medical team was asked to evaluate. CTH: No acute intracranial hemorrhage or mass effect. Moderate chronic small vessel ischemic changes. Labs significant for drop in HGB from 14.3 to 7.7.      Plan:     Lumbar spinal stenosis:    S/P  T10- Pelvis Transforaminal Lumbar interbody fusion, posterior spine fusion, Laminectomy L3-L5 by dr. Beto Holm on 10/6/22  Post op ABX as per SCIP  Hold Narcotic analgesics due to AMS  DVT ppx: Eliquis  IS.     AMS (altered mental status):   Possible related to anesthesia/narcotics  Stoped PCA  now AOX3  as per /partner Jerel Garcia, patient has history of memory loss and has had prior issues post anesthesia.    Pulmonary embolism:  H/O PE and remote AFIBB  on Eliquis  .    Lesion of brain: Unclear, not mentioned in PCP note or Neurology note, however, it was recommend since 2019 that patient have MRI which has never been performed, further workup as out patient       Hypertension: will resume home meds with holding parameters      High cholesterol:  continue statin.    ABLA (acute blood loss anemia):  got 2 units of prbcs  now hb is 10, will continue to monitor    Narcotic abuse: As per partner, has been in recovery, cannot go home with narcotics  Recommend Pain management.    Nichole's cathter void trial.             
67M  chronic pain on tramadol prn and gabapentin 1200 bid  hx of opioid abuse  partner requesting no opioids on discharge  s/p thoracic to sacrum fusion 10/6    postop course complicated by ams, which partner states is common, now resolved    med recs:  change dilaudid 2mg to q6h prn sev pain
68 y/o M with Hx of chronic pain, due to severe degenerative disc disease, prior h/o narcotic addiction, H/O PE on Eliquis, HLD, HTN, asthma, cognitive impairment ( follows with Neurologist Karl Riveranybrook) depression, remote H/O afib, migraine, recent COVID 8/22, now S/P  T10- Pelvis Transforaminal Lumbar interbody fusion, posterior spine fusion, Laminectomy L3-L5 by dr. Beto Holm on 10/6/22, patient  has been lethargic since PACU. Lethargy presumed to be  2/2 intra-op ketamine/methadone. Has not improved for several hrs and medical team was asked to evaluate. CTH: No acute intracranial hemorrhage or mass effect. Moderate chronic small vessel ischemic changes. Labs significant for drop in HGB from 14.3 to 7.7.      Plan:     Lumbar spinal stenosis: S/P  T10- Pelvis Transforaminal Lumbar interbody fusion, posterior spine fusion, Laminectomy L3-L5 by dr. Beto Holm on 10/6/22  Post op ABX as per SCIP  Hold Narcotic analgesics due to AMS  DVT ppx: Eliquis  IS.  has drain   Pain management team is following, pain is well controlled now    AMS (altered mental status): resolved   was Possible related to anesthesia/narcotics  Stoped PCA  now AOX3  as per /partner Jerel Jose, patient has history of memory loss and has had prior issues post anesthesia.      Pulmonary embolism:  H/O PE and remote AFIBB  on Eliquis  .    Lesion of brain: Unclear, not mentioned in PCP note or Neurology note, however, it was recommend since 2019 that patient have MRI which has never been performed, further workup as out patient       Hypertension: will resume home meds with holding parameters     High cholesterol:  continue statin.    ABLA (acute blood loss anemia):  got 2 units of prbcs  now hb is 10, will continue to monitor    Narcotic abuse: As per partner, has been in recovery, cannot go home with narcotics  Recommend Pain management.    Nichole's cathter d/julianna, voiding ok    Constipation: bowel meds.                      
68 y/o M with Hx of chronic pain, due to severe degenerative disc disease, prior h/o narcotic addiction, H/O PE on Eliquis, HLD, HTN, asthma, cognitive impairment ( follows with Neurologist Karl Riveranybrook) depression, remote H/O afib, migraine, recent COVID 8/22, now S/P  T10- Pelvis Transforaminal Lumbar interbody fusion, posterior spine fusion, Laminectomy L3-L5 by dr. Beto Holm on 10/6/22, patient  has been lethargic since PACU. Lethargy presumed to be  2/2 intra-op ketamine/methadone. Has not improved for several hrs and medical team was asked to evaluate. CTH: No acute intracranial hemorrhage or mass effect. Moderate chronic small vessel ischemic changes. Labs significant for drop in HGB from 14.3 to 7.7.      Plan:     Lumbar spinal stenosis: S/P  T10- Pelvis Transforaminal Lumbar interbody fusion, posterior spine fusion, Laminectomy L3-L5 by dr. Beto Holm on 10/6/22  Post op ABX as per SCIP  Hold Narcotic analgesics due to AMS  DVT ppx: Eliquis  IS.  has drain   Pain management team is following, pain is well controlled now  working with PT    AMS (altered mental status): resolved   was Possible related to anesthesia/narcotics  Stoped PCA  now AOX3  as per /partner Jerel Joes, patient has history of memory loss and has had prior issues post anesthesia.      Pulmonary embolism:  H/O PE and remote AFIBB  on Eliquis  .    Lesion of brain: Unclear, not mentioned in PCP note or Neurology note, however, it was recommend since 2019 that patient have MRI which has never been performed, further workup as out patient       Hypertension: resumed home meds with holding parameters     High cholesterol:  continue statin.    ABLA (acute blood loss anemia):  got 2 units of prbcs  now hb is 10 and has been stable    Narcotic abuse: As per partner, has been in recovery, cannot go home with narcotics  Recommend Pain management.    Nichole's cathter d/julianna, voiding ok    Constipation: bowel meds.      Patient is stable from the medicine point of view for discharge pending PT and Ortho eval.                       
66 y/o M with Hx of chronic pain, due to severe degenerative disc disease, prior h/o narcotic addiction, H/O PE on Eliquis, HLD, HTN, asthma, cognitive impairment ( follows with Neurologist Karl Riveranybrook) depression, remote H/O afib, migraine, recent COVID 8/22, now S/P  T10- Pelvis Transforaminal Lumbar interbody fusion, posterior spine fusion, Laminectomy L3-L5 by dr. Beto Holm on 10/6/22, patient  has been lethargic since PACU. Lethargy presumed to be  2/2 intra-op ketamine/methadone. Has not improved for several hrs and medical team was asked to evaluate. CTH: No acute intracranial hemorrhage or mass effect. Moderate chronic small vessel ischemic changes. Labs significant for drop in HGB from 14.3 to 7.7.      Plan:     Lumbar spinal stenosis:    S/P  T10- Pelvis Transforaminal Lumbar interbody fusion, posterior spine fusion, Laminectomy L3-L5 by dr. Beto Holm on 10/6/22  Post op ABX as per SCIP  Hold Narcotic analgesics due to AMS  DVT ppx: Eliquis  IS.     AMS (altered mental status):   Possible related to anesthesia/narcotics  Stoped PCA  now AOX3  as per /partner Jerel Garcia, patient has history of memory loss and has had prior issues post anesthesia.    Pulmonary embolism:  H/O PE and remote AFIBB  on Eliquis  .    Lesion of brain: Unclear, not mentioned in PCP note or Neurology note, however, it was recommend since 2019 that patient have MRI which has never been performed, further workup as out patient       Hypertension: will resume home meds with holding parameters      High cholesterol:  continue statin.    ABLA (acute blood loss anemia):  got 2 units of prbcs  now hb is 10, will continue to monitor    Narcotic abuse: As per partner, has been in recovery, cannot go home with narcotics  Recommend Pain management.        
66 y/o M with Hx of chronic pain, due to severe degenerative disc disease, prior h/o narcotic addiction, H/O PE on Eliquis, HLD, HTN, asthma, cognitive impairment ( follows with Neurologist Karl Riveranybrook) depression, remote H/O afib, migraine, recent COVID 8/22, now S/P  T10- Pelvis Transforaminal Lumbar interbody fusion, posterior spine fusion, Laminectomy L3-L5 by dr. Beto Holm on 10/6/22, patient  has been lethargic since PACU. Lethargy presumed to be  2/2 intra-op ketamine/methadone. Has not improved for several hrs and medical team was asked to evaluate. CTH: No acute intracranial hemorrhage or mass effect. Moderate chronic small vessel ischemic changes. Labs significant for drop in HGB from 14.3 to 7.7.      Plan:     Lumbar spinal stenosis:    S/P  T10- Pelvis Transforaminal Lumbar interbody fusion, posterior spine fusion, Laminectomy L3-L5 by dr. Beto Holm on 10/6/22  Post op ABX as per SCIP  Hold Narcotic analgesics due to AMS  DVT ppx: Eliquis  IS.  has drain   Pain management team is following, pain is well controlled now     AMS (altered mental status): resolved   was Possible related to anesthesia/narcotics  Stoped PCA  now AOX3  as per /partner Jerel Jose, patient has history of memory loss and has had prior issues post anesthesia.      Pulmonary embolism:  H/O PE and remote AFIBB  on Eliquis  .    Lesion of brain: Unclear, not mentioned in PCP note or Neurology note, however, it was recommend since 2019 that patient have MRI which has never been performed, further workup as out patient       Hypertension: will resume home meds with holding parameters     High cholesterol:  continue statin.    ABLA (acute blood loss anemia):  got 2 units of prbcs  now hb is 10, will continue to monitor    Narcotic abuse: As per partner, has been in recovery, cannot go home with narcotics  Recommend Pain management.    Nichole's cathter d/julianna, voiding ok    Constipation: bowel meds                     
68 y/o M with Hx of chronic pain, due to severe degenerative disc disease, prior h/o narcotic addiction, H/O PE on Eliquis, HLD, HTN, asthma, cognitive impairment ( follows with Neurologist Karl Alvarado [place]) depression, remote H/O afib, migraine, recent COVID 8/22, now S/P  T10- Pelvis Transforaminal Lumbar interbody fusion, posterior spine fusion, Laminectomy L3-L5 by dr. Beto Holm on 10/6/22, patient  has been lethargic since PACU. Lethargy presumed to be  2/2 intra-op ketamine/methadone. Has not improved for several hrs and medical team was asked to evaluate. CTH: No acute intracranial hemorrhage or mass effect. Moderate chronic small vessel ischemic changes. Labs significant for drop in HGB from 14.3 to 7.7.      Plan:     Lumbar spinal stenosis:    S/P  T10- Pelvis Transforaminal Lumbar interbody fusion, posterior spine fusion, Laminectomy L3-L5 by dr. Beto Holm on 10/6/22  Post op ABX as per SCIP  Hold Narcotic analgesics due to AMS  DVT ppx: Eliquis  IS.  has drain   Pain management team is following     AMS (altered mental status):   Possible related to anesthesia/narcotics  Stoped PCA  now AOX3  as per /partner Jerel Garcia, patient has history of memory loss and has had prior issues post anesthesia.      Pulmonary embolism:  H/O PE and remote AFIBB  on Eliquis  .    Lesion of brain: Unclear, not mentioned in PCP note or Neurology note, however, it was recommend since 2019 that patient have MRI which has never been performed, further workup as out patient       Hypertension: will resume home meds with holding parameters     High cholesterol:  continue statin.    ABLA (acute blood loss anemia):  got 2 units of prbcs  now hb is 10, will continue to monitor    Narcotic abuse: As per partner, has been in recovery, cannot go home with narcotics  Recommend Pain management.    Nichole's cathter d/julianna, voiding ok                
67M  chronic pain on tramadol prn and gabapentin 1200 bid  hx of opioid abuse  partner requesting no opioids on discharge  s/p thoracic to sacrum fusion 10/6  postop course complicated by ams, which partner states is common, now resolved    Pain controlled  Pain service will sign off  Please reconsult as needed     patient and partner understand that pt is to wean off dilaudid po while in rehab  no opioid rx due to hx of abuse

## 2022-10-13 NOTE — DISCHARGE NOTE NURSING/CASE MANAGEMENT/SOCIAL WORK - NSDCPEFALRISK_GEN_ALL_CORE
For information on Fall & Injury Prevention, visit: [email or web address] OR  [email or web address] OR  https://www.cdc.gov/steadi/patient.html

## 2022-10-13 NOTE — PROGRESS NOTE ADULT - PROVIDER SPECIALTY LIST ADULT
Orthopedics
Pain Medicine
Hospitalist
Orthopedics
Pain Medicine
Hospitalist
Orthopedics
Pain Medicine
Pain Medicine

## 2022-10-13 NOTE — PROGRESS NOTE ADULT - SUBJECTIVE AND OBJECTIVE BOX
Pt Name: SAMANTA PARKER    MRN: [id number]      Patient is a 67M being followed for lumbar stenosis. Patient is POD #7 s/p T10-pelvis spinal fusion. No acute events reported overnight. Patient was seen and evaluated at bedside. Patient complains of poorly controlled surgical back pain. PM following patient. Patient has been out of bed. Patient is tolerating diet and has voided post-op. No additional acute orthopedic complaints are expressed at this time. Patient is pending discharge to rehab today. Denies fever, chills, malaise, CP, SOB, abdominal pain, calf pain, numbness, or weakness.        PAST MEDICAL & SURGICAL HISTORY:  Hypertension      Lesion of brain  ? patient not sure exactly what it is he said &quot;something with my whitematter&quot; i have migraines due to it.      Migraine      High cholesterol      COPD, mild      Asthma  mild, well controlled not on any meds      Pulmonary embolism  On Eliquis      History of hip replacement  B/L twice, total 4      History of hand surgery  right wrist X10      History of prostate surgery      H/O laminectomy      H/O foot surgery  right hammer toe          Allergies: No Known Allergies      Medications: acetaminophen     Tablet .. 975 milliGRAM(s) Oral every 8 hours  albumin human  5% IVPB 250 milliLiter(s) IV Intermittent every 2 hours  aluminum hydroxide/magnesium hydroxide/simethicone Suspension 30 milliLiter(s) Oral every 12 hours PRN  apixaban 5 milliGRAM(s) Oral two times a day  benzocaine 15 mG/menthol 3.6 mG Lozenge 1 Lozenge Oral every 4 hours PRN  bisacodyl 5 milliGRAM(s) Oral every 12 hours PRN  famotidine    Tablet 20 milliGRAM(s) Oral every 12 hours PRN  gabapentin 1200 milliGRAM(s) Oral two times a day  HYDROmorphone   Tablet 2 milliGRAM(s) Oral every 4 hours PRN  influenza  Vaccine (HIGH DOSE) 0.7 milliLiter(s) IntraMuscular once  lisinopril 10 milliGRAM(s) Oral daily  magnesium hydroxide Suspension 30 milliLiter(s) Oral every 12 hours PRN  methocarbamol 1000 milliGRAM(s) Oral every 6 hours PRN  naloxone Injectable 0.1 milliGRAM(s) IV Push every 3 minutes PRN  ondansetron Injectable 4 milliGRAM(s) IV Push every 6 hours PRN  ondansetron Injectable 4 milliGRAM(s) IV Push every 6 hours PRN  polyethylene glycol 3350 17 Gram(s) Oral at bedtime  senna 2 Tablet(s) Oral at bedtime  simvastatin 20 milliGRAM(s) Oral at bedtime  sodium chloride 0.9%. 1000 milliLiter(s) IV Continuous <Continuous>  traMADol 25 milliGRAM(s) Oral every 4 hours PRN  traMADol 50 milliGRAM(s) Oral every 4 hours PRN                PHYSICAL EXAM:    Vital Signs Last 24 Hrs  T(C): 36.6 (13 Oct 2022 09:21), Max: 37 (12 Oct 2022 14:38)  T(F): 97.8 (13 Oct 2022 09:21), Max: 98.6 (12 Oct 2022 14:38)  HR: 96 (13 Oct 2022 09:21) (78 - 96)  BP: 129/84 (13 Oct 2022 09:21) (124/79 - 151/89)  BP(mean): --  RR: 17 (13 Oct 2022 09:21) (17 - 18)  SpO2: 93% (13 Oct 2022 09:21) (93% - 98%)    Parameters below as of 13 Oct 2022 09:21  Patient On (Oxygen Delivery Method): room air      Daily     Daily     Appearance: Alert, responsive, in no acute distress.    Neurological: Sensation is grossly intact to light touch to bilateral lower extremities. 5/5 motor function of bilateral lower extremities. No focal deficits or weaknesses found.    Skin: no rash on visible skin. Skin is clean, dry and intact. Dressings clean and dry.     Musculoskeletal: Appropriate TTP of surgical site.        Left Lower Extremity: SILT. Patient spontaneously moves extremity. Calf supple/nontender.         Right Lower Extremity: SILT. Patient spontaneously moves extremity. Calf supple/nontender.          A/P: Pt is a 67y Male POD #7 s/p T10 to pelvis PSF.     PLAN:   -Pain control.  -DVT PPx; Eliquis.  -WBAT.  -PT.  -OOB.  -Incentive pacheco.  -Diet/GI PPx.  -Dispo planning; T[facility]HA today.

## 2022-10-13 NOTE — DISCHARGE NOTE NURSING/CASE MANAGEMENT/SOCIAL WORK - PATIENT PORTAL LINK FT
You can access the FollowMyHealth Patient Portal offered by [facility] by registering at the following website: [email or web address]. By joining [employer]’s FollowMyHealth portal, you will also be able to view your health information using other applications (apps) compatible with our system.

## 2022-10-13 NOTE — PROGRESS NOTE ADULT - SUBJECTIVE AND OBJECTIVE BOX
SAAMNTA PARKER    [id number]    67y      Male    Patient is a 67y old  Male who presents with a chief complaint of lumbar stenosis (09 Oct 2022 11:48)      INTERVAL HPI/OVERNIGHT EVENTS:      Patients pain is better, denies fever, chills, chest pain, sob, nausea, vomiting     REVIEW OF SYSTEMS:    CONSTITUTIONAL: No fever, fatigue  RESPIRATORY: No cough, No shortness of breath  CARDIOVASCULAR: No chest pain, palpitations  GASTROINTESTINAL: No abdominal, No nausea, vomiting  NEUROLOGICAL: No headaches,  loss of strength.  MISCELLANEOUS: back pain is well controlled        Vital Signs Last 24 Hrs  T(C): 36.9 (13 Oct 2022 04:48), Max: 37 (12 Oct 2022 14:38)  T(F): 98.4 (13 Oct 2022 04:48), Max: 98.6 (12 Oct 2022 14:38)  HR: 88 (13 Oct 2022 04:48) (78 - 88)  BP: 151/89 (13 Oct 2022 04:48) (124/79 - 151/89)  RR: 18 (13 Oct 2022 04:48) (18 - 18)  SpO2: 94% (13 Oct 2022 04:48) (94% - 98%)    Parameters below as of 13 Oct 2022 04:48  Patient On (Oxygen Delivery Method): room air        PHYSICAL EXAM:    GENERAL: Middle age male looking uncomfortable   HEENT: PERRL, +EOMI  NECK: soft, Supple, No JVD  CHEST/LUNG: Clear to auscultate bilaterally; No wheezing  HEART: S1S2+, Regular rate and rhythm; No murmurs  ABDOMEN: Soft, Nontender, Nondistended; Bowel sounds present  EXTREMITIES:  1+ Peripheral Pulses, No edema  SKIN: No rashes or lesions  NEURO: AAOX3  Back: lower back with clean dressings on, has drain in place.       LABS:                  I&O's Summary    12 Oct 2022 07:01  -  13 Oct 2022 07:00  --------------------------------------------------------  IN: 1200 mL / OUT: 1400 mL / NET: -200 mL        MEDICATIONS  (STANDING):  acetaminophen     Tablet .. 975 milliGRAM(s) Oral every 8 hours  albumin human  5% IVPB 250 milliLiter(s) IV Intermittent every 2 hours  apixaban 5 milliGRAM(s) Oral two times a day  gabapentin 1200 milliGRAM(s) Oral two times a day  influenza  Vaccine (HIGH DOSE) 0.7 milliLiter(s) IntraMuscular once  lisinopril 10 milliGRAM(s) Oral daily  polyethylene glycol 3350 17 Gram(s) Oral at bedtime  senna 2 Tablet(s) Oral at bedtime  simvastatin 20 milliGRAM(s) Oral at bedtime  sodium chloride 0.9%. 1000 milliLiter(s) (100 mL/Hr) IV Continuous <Continuous>    MEDICATIONS  (PRN):  aluminum hydroxide/magnesium hydroxide/simethicone Suspension 30 milliLiter(s) Oral every 12 hours PRN Indigestion  benzocaine 15 mG/menthol 3.6 mG Lozenge 1 Lozenge Oral every 4 hours PRN Sore Throat  bisacodyl 5 milliGRAM(s) Oral every 12 hours PRN Constipation  famotidine    Tablet 20 milliGRAM(s) Oral every 12 hours PRN Dyspepsia  HYDROmorphone   Tablet 2 milliGRAM(s) Oral every 4 hours PRN Severe Pain (7 - 10)  magnesium hydroxide Suspension 30 milliLiter(s) Oral every 12 hours PRN Constipation  methocarbamol 1000 milliGRAM(s) Oral every 6 hours PRN Muscle Spasm  naloxone Injectable 0.1 milliGRAM(s) IV Push every 3 minutes PRN For ANY of the following changes in patient status:  A. RR LESS THAN 10 breaths per minute, B. Oxygen saturation LESS THAN 90%, C. Sedation score of 6  ondansetron Injectable 4 milliGRAM(s) IV Push every 6 hours PRN Nausea  ondansetron Injectable 4 milliGRAM(s) IV Push every 6 hours PRN Nausea and/or Vomiting  traMADol 25 milliGRAM(s) Oral every 4 hours PRN Mild Pain (1 - 3)  traMADol 50 milliGRAM(s) Oral every 4 hours PRN Moderate Pain (4 - 6)

## 2022-10-14 LAB — GLUCOSE BLDC GLUCOMTR-MCNC: 111 MG/DL — HIGH (ref 70–99)

## 2022-10-19 ENCOUNTER — APPOINTMENT (OUTPATIENT)
Dept: ORTHOPEDIC SURGERY | Facility: CLINIC | Age: 67
End: 2022-10-19

## 2022-10-19 VITALS — WEIGHT: 153 LBS | HEIGHT: 68 IN | BODY MASS INDEX: 23.19 KG/M2

## 2022-10-19 PROCEDURE — 99024 POSTOP FOLLOW-UP VISIT: CPT

## 2022-10-19 PROCEDURE — 72100 X-RAY EXAM L-S SPINE 2/3 VWS: CPT

## 2022-10-30 NOTE — DATA REVIEWED
[Lumbar Spine] : lumbar spine [I independently reviewed and interpreted images and report] : I independently reviewed and interpreted images and report [I reviewed the films/CD and additionally noted] : I reviewed the films/CD and additionally noted [FreeTextEntry1] : In office xrays taken today demonstrate good maintenance of alignment and implant placement

## 2022-10-30 NOTE — PHYSICAL EXAM
[No loosening of hardware] : No loosening of hardware [de-identified] : . [] : no sign of infection [FreeTextEntry3] : Incision healing well

## 2022-10-30 NOTE — HISTORY OF PRESENT ILLNESS
[de-identified] : The patient is s/p lumbar laminectomy and fusion, T10 Pelvis TLIF, PSF, Laminectomy L3-5\par Date of Surgery: 10/06/2022\par Pain:    At Rest: 9/10 \par With Activity: 9 /10 \par \par 10/19/2022 - Still experiencing immediate post operative pain (both legs and back). States his pain level is intolerable. Low back pain worse than leg pain. Difficulty with ambulation due to pain, wheelchair used in office today.  Recently switched from Dilaudid to oxycodone. Relief with oxycodone for approx 1 hour. Also taking prescribed muscle relaxer. \par \par \par

## 2022-10-30 NOTE — DISCUSSION/SUMMARY
[de-identified] : Patients incision is healing nicely, no sign of infection, sutures removed and steri strips applied in office today. Advised patient on proper wound care and management. Patient will continue treatment with home rehab. Patient advised to walk short distances more frequently to continue building strength. Advised patient to use walker until stability improves. Patient remains in significant amount of pain, he will continue treatment with oxycodone, recommended treatment with oxycodone extended-release. Discussed and reviewed results of x-ray, implants in position / no loosening of hardware. F/u in 2 weeks. \par \par Prior to appointment and during encounter with patient extensive medical records were reviewed including but not limited to, hospital records, outpatient records, imaging results, and lab data.During this appointment the patient was examined, diagnoses were discussed and explained in a face to face manner. In addition extensive time was spent reviewing aforementioned diagnostic studies. Counseling including abnormal image results, differential diagnoses, treatment options, risk and benefits, lifestyle changes, current condition, and current medications was performed. Patient's comments, questions, and concerns were addressed and patient verbalized understanding. Based on this patient's presentation at our office, which is an orthopedic spine surgeon's office, this patient inherently / intrinsically has a risk, however minute, of developing issues such as Cauda equina syndrome, bowel and bladder changes, or progression of motor or neurological deficits such as paralysis which may be permanent.\par \par ISI COTTON Acting as a Scribe for Isi Greer, attest that this documentation has been prepared under the direction and in the presence of Provider Mihai Pérez MD.

## 2022-11-02 ENCOUNTER — APPOINTMENT (OUTPATIENT)
Dept: ORTHOPEDIC SURGERY | Facility: CLINIC | Age: 67
End: 2022-11-02

## 2022-11-02 VITALS — WEIGHT: 163 LBS | HEIGHT: 68 IN | BODY MASS INDEX: 24.71 KG/M2

## 2022-11-02 PROCEDURE — 99024 POSTOP FOLLOW-UP VISIT: CPT

## 2022-11-02 RX ORDER — MORPHINE SULFATE 15 MG/1
15 TABLET, FILM COATED, EXTENDED RELEASE ORAL AT BEDTIME
Qty: 15 | Refills: 0 | Status: ACTIVE | COMMUNITY
Start: 2022-11-02 | End: 1900-01-01

## 2022-11-03 NOTE — DISCUSSION/SUMMARY
[de-identified] : Patients incision is healing nicely, no sign of infection. Patient will continue treatment with home rehab. Patient advised to walk short distances more frequently to continue building strength. Advised patient to continue ambulation with cane. Patient remains in significant amount of pain, prescription given for morphine sulfate extended release. Titration schedule provided. Medication management and risks reviewed.  Patient will f/u in 2-3 weeks. \par \par Prior to appointment and during encounter with patient extensive medical records were reviewed including but not limited to, hospital records, outpatient records, imaging results, and lab data.During this appointment the patient was examined, diagnoses were discussed and explained in a face to face manner. In addition extensive time was spent reviewing aforementioned diagnostic studies. Counseling including abnormal image results, differential diagnoses, treatment options, risk and benefits, lifestyle changes, current condition, and current medications was performed. Patient's comments, questions, and concerns were addressed and patient verbalized understanding. Based on this patient's presentation at our office, which is an orthopedic spine surgeon's office, this patient inherently / intrinsically has a risk, however minute, of developing issues such as Cauda equina syndrome, bowel and bladder changes, or progression of motor or neurological deficits such as paralysis which may be permanent.\par \par ISI SUTHERLAND Acting as a Scribe for Dr. Noman SU, Isi Sutherland, attest that this documentation has been prepared under the direction and in the presence of Provider Mihai Pérez MD.

## 2022-11-03 NOTE — PHYSICAL EXAM
[No loosening of hardware] : No loosening of hardware [] : no sign of infection [FreeTextEntry3] : Incision healing well

## 2022-11-03 NOTE — HISTORY OF PRESENT ILLNESS
[de-identified] : 11/02/2022 - Symptoms significantly improved since his last visit from treatment with morphine sulfate extended-release (every 6 hours). Using Tylenol and gabapentin. Still experiencing pain, but moving in the right direction. Ambulating with cane in office today. Leg pain is still present, but improving. Walking tolerance improving. \par \par 10/19/2022 - The patient is s/p lumbar laminectomy and fusion, T10 Pelvis TLIF, PSF, Laminectomy L3-5\par Date of Surgery: 10/06/2022\par Pain:    At Rest: 9/10 \par With Activity: 9 /10 \par \par 10/19/2022 - Still experiencing immediate post operative pain (both legs and back). States his pain level is intolerable. Low back pain worse than leg pain. Difficulty with ambulation due to pain, wheelchair used in office today.  Recently switched from Dilaudid to oxycodone. Relief with oxycodone for approx 1 hour. Also taking prescribed muscle relaxer. \par \par \par

## 2022-11-06 ENCOUNTER — FORM ENCOUNTER (OUTPATIENT)
Age: 67
End: 2022-11-06

## 2022-11-09 ENCOUNTER — FORM ENCOUNTER (OUTPATIENT)
Age: 67
End: 2022-11-09

## 2022-11-09 PROCEDURE — 82435 ASSAY OF BLOOD CHLORIDE: CPT

## 2022-11-09 PROCEDURE — 84295 ASSAY OF SERUM SODIUM: CPT

## 2022-11-09 PROCEDURE — 85018 HEMOGLOBIN: CPT

## 2022-11-09 PROCEDURE — 70450 CT HEAD/BRAIN W/O DYE: CPT

## 2022-11-09 PROCEDURE — C1713: CPT

## 2022-11-09 PROCEDURE — 83605 ASSAY OF LACTIC ACID: CPT

## 2022-11-09 PROCEDURE — U0005: CPT

## 2022-11-09 PROCEDURE — C1769: CPT

## 2022-11-09 PROCEDURE — 82947 ASSAY GLUCOSE BLOOD QUANT: CPT

## 2022-11-09 PROCEDURE — 84132 ASSAY OF SERUM POTASSIUM: CPT

## 2022-11-09 PROCEDURE — 84100 ASSAY OF PHOSPHORUS: CPT

## 2022-11-09 PROCEDURE — 80053 COMPREHEN METABOLIC PANEL: CPT

## 2022-11-09 PROCEDURE — 36430 TRANSFUSION BLD/BLD COMPNT: CPT

## 2022-11-09 PROCEDURE — 82330 ASSAY OF CALCIUM: CPT

## 2022-11-09 PROCEDURE — P9045: CPT

## 2022-11-09 PROCEDURE — 80048 BASIC METABOLIC PNL TOTAL CA: CPT

## 2022-11-09 PROCEDURE — P9016: CPT

## 2022-11-09 PROCEDURE — 85025 COMPLETE CBC W/AUTO DIFF WBC: CPT

## 2022-11-09 PROCEDURE — C1889: CPT

## 2022-11-09 PROCEDURE — 76000 FLUOROSCOPY <1 HR PHYS/QHP: CPT

## 2022-11-09 PROCEDURE — P9040: CPT

## 2022-11-09 PROCEDURE — 83735 ASSAY OF MAGNESIUM: CPT

## 2022-11-09 PROCEDURE — C1763: CPT

## 2022-11-09 PROCEDURE — 36415 COLL VENOUS BLD VENIPUNCTURE: CPT

## 2022-11-09 PROCEDURE — 85014 HEMATOCRIT: CPT

## 2022-11-09 PROCEDURE — 93005 ELECTROCARDIOGRAM TRACING: CPT

## 2022-11-09 PROCEDURE — 82962 GLUCOSE BLOOD TEST: CPT

## 2022-11-09 PROCEDURE — U0003: CPT

## 2022-11-09 PROCEDURE — 85027 COMPLETE CBC AUTOMATED: CPT

## 2022-11-09 PROCEDURE — 82803 BLOOD GASES ANY COMBINATION: CPT

## 2022-11-10 RX ORDER — OXYCODONE 5 MG/1
5 TABLET ORAL
Qty: 60 | Refills: 0 | Status: ACTIVE | COMMUNITY
Start: 2022-11-10 | End: 1900-01-01

## 2022-11-16 ENCOUNTER — APPOINTMENT (OUTPATIENT)
Dept: ORTHOPEDIC SURGERY | Facility: CLINIC | Age: 67
End: 2022-11-16

## 2022-11-16 VITALS — BODY MASS INDEX: 24.71 KG/M2 | WEIGHT: 163 LBS | HEIGHT: 68 IN

## 2022-11-16 PROCEDURE — 99024 POSTOP FOLLOW-UP VISIT: CPT

## 2022-11-16 PROCEDURE — 72100 X-RAY EXAM L-S SPINE 2/3 VWS: CPT

## 2022-11-20 NOTE — DISCUSSION/SUMMARY
[de-identified] : Discussed and reviewed results of x-ray. Patient given referral for formal physical therapy. Patients incision is healing nicely, no sign of infection. Patient advised to walk short distances more frequently to continue building strength. Advised patient to continue ambulation with cane. Patient remains in significant amount of pain, continue treatment with morphine sulfate extended release. Medication management and risks reviewed. Advised patient on proper medication tapering. \par \par Prior to appointment and during encounter with patient extensive medical records were reviewed including but not limited to, hospital records, outpatient records, imaging results, and lab data.During this appointment the patient was examined, diagnoses were discussed and explained in a face to face manner. In addition extensive time was spent reviewing aforementioned diagnostic studies. Counseling including abnormal image results, differential diagnoses, treatment options, risk and benefits, lifestyle changes, current condition, and current medications was performed. Patient's comments, questions, and concerns were addressed and patient verbalized understanding. Based on this patient's presentation at our office, which is an orthopedic spine surgeon's office, this patient inherently / intrinsically has a risk, however minute, of developing issues such as Cauda equina syndrome, bowel and bladder changes, or progression of motor or neurological deficits such as paralysis which may be permanent.\par \par ISI SUTHERLAND Acting as a Scribe for Dr. Noman SU, Isi Sutherland, attest that this documentation has been prepared under the direction and in the presence of Provider Mihai Pérez MD.

## 2022-11-20 NOTE — HISTORY OF PRESENT ILLNESS
[de-identified] : 11/16/2022 - 68 y/o male presenting in follow up. Overall he states he is doing well, but aggravated symptoms today. Using bone fusion stimulator, states episodic pain 2 X after usage. Continues to use morphine sulfate extended release, Tylenol, and gabapentin, weening off. Ambulating well, posture is normal. Using cane for ambulation as a safety precaution. Mild sciatic pains in the b/l lower extremities. \par \par 11/02/2022 - Symptoms significantly improved since his last visit from treatment with morphine sulfate extended-release (every 6 hours). Using Tylenol and gabapentin. Still experiencing pain, but moving in the right direction. Ambulating with cane in office today. Leg pain is still present, but improving. Walking tolerance improving. \par \par 10/19/2022 - The patient is s/p lumbar laminectomy and fusion, T10 Pelvis TLIF, PSF, Laminectomy L3-5\par Date of Surgery: 10/06/2022\par Pain: At Rest: 9/10 \par With Activity: 9 /10 \par \par 10/19/2022 - Still experiencing immediate post operative pain (both legs and back). States his pain level is intolerable. Low back pain worse than leg pain. Difficulty with ambulation due to pain, wheelchair used in office today. Recently switched from Dilaudid to oxycodone. Relief with oxycodone for approx 1 hour. Also taking prescribed muscle relaxer. \par \par

## 2022-12-28 ENCOUNTER — APPOINTMENT (OUTPATIENT)
Dept: ORTHOPEDIC SURGERY | Facility: CLINIC | Age: 67
End: 2022-12-28
Payer: MEDICARE

## 2022-12-28 VITALS — HEIGHT: 68 IN | WEIGHT: 163 LBS | BODY MASS INDEX: 24.71 KG/M2

## 2022-12-28 PROCEDURE — 99024 POSTOP FOLLOW-UP VISIT: CPT

## 2022-12-28 PROCEDURE — 72040 X-RAY EXAM NECK SPINE 2-3 VW: CPT

## 2022-12-28 PROCEDURE — 72100 X-RAY EXAM L-S SPINE 2/3 VWS: CPT

## 2022-12-29 NOTE — DISCUSSION/SUMMARY
[de-identified] : Discussed and reviewed results of x-ray. Patient given referral for formal physical therapy. Patient advised to walk short distances more frequently to continue building strength. Advised patient to continue ambulation with cane. He will continue current medication regiment with tramadol as prescribed. F/U in 1 month. \par \par Prior to appointment and during encounter with patient extensive medical records were reviewed including but not limited to, hospital records, outpatient records, imaging results, and lab data.During this appointment the patient was examined, diagnoses were discussed and explained in a face to face manner. In addition extensive time was spent reviewing aforementioned diagnostic studies. Counseling including abnormal image results, differential diagnoses, treatment options, risk and benefits, lifestyle changes, current condition, and current medications was performed. Patient's comments, questions, and concerns were addressed and patient verbalized understanding. Based on this patient's presentation at our office, which is an orthopedic spine surgeon's office, this patient inherently / intrinsically has a risk, however minute, of developing issues such as Cauda equina syndrome, bowel and bladder changes, or progression of motor or neurological deficits such as paralysis which may be permanent.\par \par ISI SUTHERLAND Acting as a Scribe for Dr. Noman SU, Isi Sutherland, attest that this documentation has been prepared under the direction and in the presence of Provider Mihai Pérez MD.

## 2022-12-29 NOTE — HISTORY OF PRESENT ILLNESS
[de-identified] : 12/28/2022: The patient is s/p Lumbar laminectomy and fusion, T10 Pelvis TLIF, PSF, Laminectomy L3-5. Reports fall since his last visit, as a result he received surgery in the posterior region of the neck. Completed post hospital physical therapy [place], currently enrolled in OPPT. Experiencing expressive aphasia after most recent operation. Symptom in the neck have aggravated his back pain. He is actively ambulating, no longer using cane. balance is normal. Dexterity is normal. He has continued medication regimen with tramadol. No longer using oxycodone. \par \par Date of Surgery: 10/06/2022\par Pain:    At Rest: 4/10 \par With Activity:  4/10 \par \par 11/16/2022 - 66 y/o male presenting in follow up. Overall he states he is doing well, but aggravated symptoms today. Using bone fusion stimulator, states episodic pain 2 X after usage. Continues to use morphine sulfate extended release, Tylenol, and gabapentin, weening off. Ambulating well, posture is normal. Using cane for ambulation as a safety precaution. Mild sciatic pains in the b/l lower extremities. \par \par 11/02/2022 - Symptoms significantly improved since his last visit from treatment with morphine sulfate extended-release (every 6 hours). Using Tylenol and gabapentin. Still experiencing pain, but moving in the right direction. Ambulating with cane in office today. Leg pain is still present, but improving. Walking tolerance improving. \par \par 10/19/2022 - The patient is s/p lumbar laminectomy and fusion, T10 Pelvis TLIF, PSF, Laminectomy L3-5\par Date of Surgery: 10/06/2022\par Pain: At Rest: 9/10 \par With Activity: 9 /10 \par \par 10/19/2022 - Still experiencing immediate post operative pain (both legs and back). States his pain level is intolerable. Low back pain worse than leg pain. Difficulty with ambulation due to pain, wheelchair used in office today. Recently switched from Dilaudid to oxycodone. Relief with oxycodone for approx 1 hour. Also taking prescribed muscle relaxer. \par \par

## 2022-12-29 NOTE — DATA REVIEWED
[Thoracic Spine] : thoracic spine [Lumbar Spine] : lumbar spine [Cervical Spine] : cervical spine [I independently reviewed and interpreted images and report] : I independently reviewed and interpreted images and report [I reviewed the films/CD and additionally noted] : I reviewed the films/CD and additionally noted [FreeTextEntry1] : In office xrays taken today demonstrate no change in alignment or implant placement [FreeTextEntry2] : In office xrays taken today demonstrate stable ACDF C4-5-6. New laminectomy L3

## 2022-12-29 NOTE — PHYSICAL EXAM
[Implants in position] : Implants in position [No loosening of hardware] : No loosening of hardware [Fusion intact] : Fusion intact [de-identified] : Constitutional:\par - General Appearance:\par Unremarkable\par Body Habitus\par Well Developed\par Well Nourished\par Body Habitus\par No Deformities\par Well Groomed\par Ability To communicate:\par Normal\par Neurologic:\par Global sensation is intact to upper and lower extremities. See examination of Neck and/or Spine\par for exceptions.\par Orientation to Time, Place and Person is: Normal\par Mood And Affect is Normal\par Skin:\par - Head/Face, Right Upper/Lower Extremity, Left Upper/Lower Extremity: Normal\par See Examination of Neck and/or Spine for exceptions\par Cardiovascular:\par Peripheral Cardiovascular System is Normal\par Palpation of Lymph Nodes:\par Normal Palpation of lymph nodes in: Axilla, Cervical, Inguinal\par Abnormal Palpation of lymph nodes in: None  [FreeTextEntry3] : posterior incision of the neck healing nicely  [] : clonus not sustained at ankle

## 2023-01-25 ENCOUNTER — APPOINTMENT (OUTPATIENT)
Dept: ORTHOPEDIC SURGERY | Facility: CLINIC | Age: 68
End: 2023-01-25
Payer: MEDICARE

## 2023-01-25 VITALS — BODY MASS INDEX: 24.71 KG/M2 | WEIGHT: 163 LBS | HEIGHT: 68 IN

## 2023-01-25 PROCEDURE — 72100 X-RAY EXAM L-S SPINE 2/3 VWS: CPT

## 2023-01-25 PROCEDURE — 99214 OFFICE O/P EST MOD 30 MIN: CPT

## 2023-01-29 NOTE — PHYSICAL EXAM
[Implants in position] : Implants in position [No loosening of hardware] : No loosening of hardware [Normal Coordination] : normal coordination [Normal DTR UE/LE] : normal DTR UE/LE  [Normal Sensation] : normal sensation [Normal Mood and Affect] : normal mood and affect [Orientated] : orientated [Able to Communicate] : able to communicate [Normal Skin] : normal skin [No Rash] : no rash [No Ulcers] : no ulcers [No Lesions] : no lesions [No obvious lymphadenopathy in areas examined] : no obvious lymphadenopathy in areas examined [Well Developed] : well developed [Well Nourished] : well nourished [Peripheral vascular exam is grossly normal] : peripheral vascular exam is grossly normal [No Respiratory Distress] : no respiratory distress [de-identified] : Constitutional:\par - General Appearance:\par Unremarkable\par Body Habitus\par Well Developed\par Well Nourished\par Body Habitus\par No Deformities\par Well Groomed\par Ability To communicate:\par Normal\par Neurologic:\par Global sensation is intact to upper and lower extremities. See examination of Neck and/or Spine\par for exceptions.\par Orientation to Time, Place and Person is: Normal\par Mood And Affect is Normal\par Skin:\par - Head/Face, Right Upper/Lower Extremity, Left Upper/Lower Extremity: Normal\par See Examination of Neck and/or Spine for exceptions\par Cardiovascular:\par Peripheral Cardiovascular System is Normal\par Palpation of Lymph Nodes:\par Normal Palpation of lymph nodes in: Axilla, Cervical, Inguinal\par Abnormal Palpation of lymph nodes in: None  [] : clonus not sustained at ankle

## 2023-01-29 NOTE — REASON FOR VISIT
[FreeTextEntry2] : Follow up:  Lumbar laminectomy and fusion, T10 Pelvis TLIF, PSF, Laminectomy L3-5 on 10/06/2022

## 2023-01-29 NOTE — DISCUSSION/SUMMARY
[de-identified] : X-RAY in office revealed no implant loosening, hardware intact. I advised the patient to continue his current PT trial, and to begin implementing the exercises they're teaching him into a daily routine. Patient advised to walk short distances more frequently to continue building strength.  He will continue current medication regiment with tramadol as prescribed (renewal given). F/U in 1 month. \par \par Prior to appointment and during encounter with patient extensive medical records were reviewed including but not limited to, hospital records, outpatient records, imaging results, and lab data.During this appointment the patient was examined, diagnoses were discussed and explained in a face to face manner. In addition extensive time was spent reviewing aforementioned diagnostic studies. Counseling including abnormal image results, differential diagnoses, treatment options, risk and benefits, lifestyle changes, current condition, and current medications was performed. Patient's comments, questions, and concerns were addressed and patient verbalized understanding. Based on this patient's presentation at our office, which is an orthopedic spine surgeon's office, this patient inherently / intrinsically has a risk, however minute, of developing issues such as Cauda equina syndrome, bowel and bladder changes, or progression of motor or neurological deficits such as paralysis which may be permanent.\par \par ISI SUTHERLAND Acting as a Scribe for Dr. Tineo I, Isi Sutherland, attest that this documentation has been prepared under the direction and in the presence of Provider Mihai Pérez MD.

## 2023-01-29 NOTE — DATA REVIEWED
[I independently reviewed and interpreted images and report] : I independently reviewed and interpreted images and report [I reviewed the films/CD and additionally noted] : I reviewed the films/CD and additionally noted [FreeTextEntry1] : I stop paperwork reviewed

## 2023-01-29 NOTE — HISTORY OF PRESENT ILLNESS
[de-identified] : 01/25/2023: The patient is s/p Lumbar laminectomy and fusion, T10 Pelvis TLIF, PSF, Laminectomy L3-5. He reports aggravated symptoms since his last visit. Complains of all over body pain,  paraspinal low back pain. Balance has been gradually improving. Difficulty with posture. Reports fall the other night, hurt his knee, but states it was low impact. Completing PT 3 x / week, which he states is helpful. Continued neck pain. \par Date of Surgery:10/06/2022\par Pain:    At Rest: _/10 \par With Activity:  _/10 \par \par 12/28/2022: The patient is s/p Lumbar laminectomy and fusion, T10 Pelvis TLIF, PSF, Laminectomy L3-5. Reports fall since his last visit, as a result he received surgery in the posterior region of the neck. Completed post hospital physical therapy [place], currently enrolled in OPPT. Experiencing expressive aphasia after most recent operation. Symptom in the neck have aggravated his back pain. He is actively ambulating, no longer using cane. balance is normal. Dexterity is normal. He has continued medication regimen with tramadol. No longer using oxycodone. \par \par Date of Surgery: 10/06/2022\par Pain:    At Rest: 4/10 \par With Activity:  4/10 \par \par 11/16/2022 - 68 y/o male presenting in follow up. Overall he states he is doing well, but aggravated symptoms today. Using bone fusion stimulator, states episodic pain 2 X after usage. Continues to use morphine sulfate extended release, Tylenol, and gabapentin, weening off. Ambulating well, posture is normal. Using cane for ambulation as a safety precaution. Mild sciatic pains in the b/l lower extremities. \par \par 11/02/2022 - Symptoms significantly improved since his last visit from treatment with morphine sulfate extended-release (every 6 hours). Using Tylenol and gabapentin. Still experiencing pain, but moving in the right direction. Ambulating with cane in office today. Leg pain is still present, but improving. Walking tolerance improving. \par \par 10/19/2022 - The patient is s/p lumbar laminectomy and fusion, T10 Pelvis TLIF, PSF, Laminectomy L3-5\par Date of Surgery: 10/06/2022\par Pain: At Rest: 9/10 \par With Activity: 9 /10 \par \par 10/19/2022 - Still experiencing immediate post operative pain (both legs and back). States his pain level is intolerable. Low back pain worse than leg pain. Difficulty with ambulation due to pain, wheelchair used in office today. Recently switched from Dilaudid to oxycodone. Relief with oxycodone for approx 1 hour. Also taking prescribed muscle relaxer. \par \par

## 2023-02-15 ENCOUNTER — APPOINTMENT (OUTPATIENT)
Dept: ORTHOPEDIC SURGERY | Facility: CLINIC | Age: 68
End: 2023-02-15
Payer: MEDICARE

## 2023-02-15 VITALS — HEIGHT: 68 IN | WEIGHT: 163 LBS | BODY MASS INDEX: 24.71 KG/M2

## 2023-02-15 PROCEDURE — 99213 OFFICE O/P EST LOW 20 MIN: CPT

## 2023-02-15 NOTE — DISCUSSION/SUMMARY
[de-identified] : I advised the patient to continue his current PT trial, and to continue implementing the exercises they're teaching him into a daily routine.Renewal provided for PT.  Patient advised to walk short distances more frequently to continue building strength.  He will continue current medication regiment with tramadol as prescribed (renewal given). F/U in 1 month. \par \par Prior to appointment and during encounter with patient extensive medical records were reviewed including but not limited to, hospital records, outpatient records, imaging results, and lab data.During this appointment the patient was examined, diagnoses were discussed and explained in a face to face manner. In addition extensive time was spent reviewing aforementioned diagnostic studies. Counseling including abnormal image results, differential diagnoses, treatment options, risk and benefits, lifestyle changes, current condition, and current medications was performed. Patient's comments, questions, and concerns were addressed and patient verbalized understanding. Based on this patient's presentation at our office, which is an orthopedic spine surgeon's office, this patient inherently / intrinsically has a risk, however minute, of developing issues such as Cauda equina syndrome, bowel and bladder changes, or progression of motor or neurological deficits such as paralysis which may be permanent.\par \par ISI SUTHERLAND Acting as a Scribe for Dr. Tineo I, Isi Sutherland, attest that this documentation has been prepared under the direction and in the presence of Provider Mihai Pérez MD.

## 2023-02-15 NOTE — HISTORY OF PRESENT ILLNESS
[de-identified] : 02/15/2023 - 68 y/o M presenting for a follow up. Since his last visit, low back pain is gradually improving from physical therapy 3 x / week, which is helpful. 1 fall since his last visit due to instability, not weakness. Sciatica and subjective weakness is noted in the b/l legs, improving from PT. Tramadol reduces the severity of his pains, as well as Tylenol. \par Date of Surgery:10/06/2022\par Pain: At Rest: 0/10 \par With Activity: 9-10/10 \par Treatment/ Imagining/ Studies since last visit: PT 3x a week\par Additional information: pain radiates to tailbone\par \par 01/25/2023: The patient is s/p Lumbar laminectomy and fusion, T10 Pelvis TLIF, PSF, Laminectomy L3-5. He reports aggravated symptoms since his last visit. Complains of all over body pain,  paraspinal low back pain. Balance has been gradually improving. Difficulty with posture. Reports fall the other night, hurt his knee, but states it was low impact. Completing PT 3 x / week, which he states is helpful. Continued neck pain. \par Date of Surgery:10/06/2022\par Pain:    At Rest: _/10 \par With Activity:  _/10 \par \par 12/28/2022: The patient is s/p Lumbar laminectomy and fusion, T10 Pelvis TLIF, PSF, Laminectomy L3-5. Reports fall since his last visit, as a result he received surgery in the posterior region of the neck. Completed post hospital physical therapy [place], currently enrolled in OPPT. Experiencing expressive aphasia after most recent operation. Symptom in the neck have aggravated his back pain. He is actively ambulating, no longer using cane. balance is normal. Dexterity is normal. He has continued medication regimen with tramadol. No longer using oxycodone. \par \par Date of Surgery: 10/06/2022\par Pain:    At Rest: 4/10 \par With Activity:  4/10 \par \par 11/16/2022 - 68 y/o male presenting in follow up. Overall he states he is doing well, but aggravated symptoms today. Using bone fusion stimulator, states episodic pain 2 X after usage. Continues to use morphine sulfate extended release, Tylenol, and gabapentin, weening off. Ambulating well, posture is normal. Using cane for ambulation as a safety precaution. Mild sciatic pains in the b/l lower extremities. \par \par 11/02/2022 - Symptoms significantly improved since his last visit from treatment with morphine sulfate extended-release (every 6 hours). Using Tylenol and gabapentin. Still experiencing pain, but moving in the right direction. Ambulating with cane in office today. Leg pain is still present, but improving. Walking tolerance improving. \par \par 10/19/2022 - The patient is s/p lumbar laminectomy and fusion, T10 Pelvis TLIF, PSF, Laminectomy L3-5\par Date of Surgery: 10/06/2022\par Pain: At Rest: 9/10 \par With Activity: 9 /10 \par \par 10/19/2022 - Still experiencing immediate post operative pain (both legs and back). States his pain level is intolerable. Low back pain worse than leg pain. Difficulty with ambulation due to pain, wheelchair used in office today. Recently switched from Dilaudid to oxycodone. Relief with oxycodone for approx 1 hour. Also taking prescribed muscle relaxer. \par \par

## 2023-02-15 NOTE — PHYSICAL EXAM
[Normal Coordination] : normal coordination [Normal DTR UE/LE] : normal DTR UE/LE  [Normal Sensation] : normal sensation [Normal Mood and Affect] : normal mood and affect [Orientated] : orientated [Able to Communicate] : able to communicate [Normal Skin] : normal skin [No Rash] : no rash [No Ulcers] : no ulcers [No Lesions] : no lesions [No obvious lymphadenopathy in areas examined] : no obvious lymphadenopathy in areas examined [Well Developed] : well developed [Well Nourished] : well nourished [Peripheral vascular exam is grossly normal] : peripheral vascular exam is grossly normal [No Respiratory Distress] : no respiratory distress [Flexion] : flexion [Extension] : extension [de-identified] : Constitutional:\par - General Appearance:\par Unremarkable\par Body Habitus\par Well Developed\par Well Nourished\par Body Habitus\par No Deformities\par Well Groomed\par Ability To communicate:\par Normal\par Neurologic:\par Global sensation is intact to upper and lower extremities. See examination of Neck and/or Spine\par for exceptions.\par Orientation to Time, Place and Person is: Normal\par Mood And Affect is Normal\par Skin:\par - Head/Face, Right Upper/Lower Extremity, Left Upper/Lower Extremity: Normal\par See Examination of Neck and/or Spine for exceptions\par Cardiovascular:\par Peripheral Cardiovascular System is Normal\par Palpation of Lymph Nodes:\par Normal Palpation of lymph nodes in: Axilla, Cervical, Inguinal\par Abnormal Palpation of lymph nodes in: None  [] : clonus not sustained at ankle

## 2023-03-22 ENCOUNTER — APPOINTMENT (OUTPATIENT)
Dept: ORTHOPEDIC SURGERY | Facility: CLINIC | Age: 68
End: 2023-03-22

## 2023-03-29 ENCOUNTER — APPOINTMENT (OUTPATIENT)
Dept: ORTHOPEDIC SURGERY | Facility: CLINIC | Age: 68
End: 2023-03-29
Payer: MEDICARE

## 2023-03-29 VITALS — WEIGHT: 163 LBS | BODY MASS INDEX: 24.71 KG/M2 | HEIGHT: 68 IN

## 2023-03-29 PROCEDURE — 99214 OFFICE O/P EST MOD 30 MIN: CPT

## 2023-03-29 RX ORDER — TRAMADOL HYDROCHLORIDE 100 MG/1
100 TABLET, EXTENDED RELEASE ORAL EVERY 6 HOURS
Qty: 120 | Refills: 0 | Status: ACTIVE | COMMUNITY
Start: 2023-03-29 | End: 1900-01-01

## 2023-03-30 NOTE — DISCUSSION/SUMMARY
[de-identified] : Strongly recommended patient initiate supervised PT trial, patient experiencing persistent symptoms after discontinued PT. Patient was making progress from formal PT trial and it is recommended he continues at this patsy. Renewal provided for PT.  He will continue current medication regiment with tramadol as prescribed and Tylenol PRN (renewal given). Prescription provided for medical massage. F/U 5/6 wks. \par \par Prior to appointment and during encounter with patient extensive medical records were reviewed including but not limited to, hospital records, outpatient records, imaging results, and lab data.During this appointment the patient was examined, diagnoses were discussed and explained in a face to face manner. In addition extensive time was spent reviewing aforementioned diagnostic studies. Counseling including abnormal image results, differential diagnoses, treatment options, risk and benefits, lifestyle changes, current condition, and current medications was performed. Patient's comments, questions, and concerns were addressed and patient verbalized understanding. Based on this patient's presentation at our office, which is an orthopedic spine surgeon's office, this patient inherently / intrinsically has a risk, however minute, of developing issues such as Cauda equina syndrome, bowel and bladder changes, or progression of motor or neurological deficits such as paralysis which may be permanent.\par \par ISI SUTHERLAND Acting as a Scribe for Dr. Tineo I, Isi Sutherland, attest that this documentation has been prepared under the direction and in the presence of Provider Mihai Pérez MD.

## 2023-03-30 NOTE — PHYSICAL EXAM
[Normal Coordination] : normal coordination [Normal DTR UE/LE] : normal DTR UE/LE  [Normal Sensation] : normal sensation [Normal Mood and Affect] : normal mood and affect [Orientated] : orientated [Able to Communicate] : able to communicate [Normal Skin] : normal skin [No Rash] : no rash [No Ulcers] : no ulcers [No Lesions] : no lesions [No obvious lymphadenopathy in areas examined] : no obvious lymphadenopathy in areas examined [Well Developed] : well developed [Well Nourished] : well nourished [Peripheral vascular exam is grossly normal] : peripheral vascular exam is grossly normal [No Respiratory Distress] : no respiratory distress [Flexion] : flexion [Extension] : extension [de-identified] : Constitutional:\par - General Appearance:\par Unremarkable\par Body Habitus\par Well Developed\par Well Nourished\par Body Habitus\par No Deformities\par Well Groomed\par Ability To communicate:\par Normal\par Neurologic:\par Global sensation is intact to upper and lower extremities. See examination of Neck and/or Spine\par for exceptions.\par Orientation to Time, Place and Person is: Normal\par Mood And Affect is Normal\par Skin:\par - Head/Face, Right Upper/Lower Extremity, Left Upper/Lower Extremity: Normal\par See Examination of Neck and/or Spine for exceptions\par Cardiovascular:\par Peripheral Cardiovascular System is Normal\par Palpation of Lymph Nodes:\par Normal Palpation of lymph nodes in: Axilla, Cervical, Inguinal\par Abnormal Palpation of lymph nodes in: None  [] : clonus not sustained at ankle [de-identified] : d

## 2023-03-30 NOTE — DATA REVIEWED
[FreeTextEntry1] : I stop paperwork reviewed\par PT progress notes reviewed\par Case discussed with PT

## 2023-03-30 NOTE — REASON FOR VISIT
[FreeTextEntry2] : Follow up:  s/p Lumbar laminectomy and fusion, T10 Pelvis TLIF, PSF, Laminectomy L3-5

## 2023-03-30 NOTE — HISTORY OF PRESENT ILLNESS
[de-identified] : 3/29/23: Patient presenting for a follow up, s/p Lumbar laminectomy and fusion, T10 Pelvis TLIF, PSF, Laminectomy L3-5. Patient experiencing worse symptoms since last visit due to discontinued physical therapy. Patient reports relief of symptoms while enrolled in formal PT trial. He is completing independent exercises at the gym, pain is breaking through. His chief complaint today is left sided focal back pain. No pain, numbness/tingling, or changes in strength in the lower extremities b/l. Gait is intact. Medicine wise he is using tramadol and extra strength Tylenol which is helpful. \par \par 02/15/2023 - 68 y/o M presenting for a follow up. Since his last visit, low back pain is gradually improving from physical therapy 3 x / week, which is helpful. 1 fall since his last visit due to instability, not weakness. Sciatica and subjective weakness is noted in the b/l legs, improving from PT. Tramadol reduces the severity of his pains, as well as Tylenol. \par Date of Surgery:10/06/2022\par Pain: At Rest: 0/10 \par With Activity: 9-10/10 \par Treatment/ Imagining/ Studies since last visit: PT 3x a week\par Additional information: pain radiates to tailbone\par \par 01/25/2023: The patient is s/p Lumbar laminectomy and fusion, T10 Pelvis TLIF, PSF, Laminectomy L3-5. He reports aggravated symptoms since his last visit. Complains of all over body pain,  paraspinal low back pain. Balance has been gradually improving. Difficulty with posture. Reports fall the other night, hurt his knee, but states it was low impact. Completing PT 3 x / week, which he states is helpful. Continued neck pain. \par Date of Surgery:10/06/2022\par Pain:    At Rest: _/10 \par With Activity:  _/10 \par \par 12/28/2022: The patient is s/p Lumbar laminectomy and fusion, T10 Pelvis TLIF, PSF, Laminectomy L3-5. Reports fall since his last visit, as a result he received surgery in the posterior region of the neck. Completed post hospital physical therapy [place], currently enrolled in OPPT. Experiencing expressive aphasia after most recent operation. Symptom in the neck have aggravated his back pain. He is actively ambulating, no longer using cane. balance is normal. Dexterity is normal. He has continued medication regimen with tramadol. No longer using oxycodone. \par \par Date of Surgery: 10/06/2022\par Pain:    At Rest: 4/10 \par With Activity:  4/10 \par \par 11/16/2022 - 68 y/o male presenting in follow up. Overall he states he is doing well, but aggravated symptoms today. Using bone fusion stimulator, states episodic pain 2 X after usage. Continues to use morphine sulfate extended release, Tylenol, and gabapentin, weening off. Ambulating well, posture is normal. Using cane for ambulation as a safety precaution. Mild sciatic pains in the b/l lower extremities. \par \par 11/02/2022 - Symptoms significantly improved since his last visit from treatment with morphine sulfate extended-release (every 6 hours). Using Tylenol and gabapentin. Still experiencing pain, but moving in the right direction. Ambulating with cane in office today. Leg pain is still present, but improving. Walking tolerance improving. \par \par 10/19/2022 - The patient is s/p lumbar laminectomy and fusion, T10 Pelvis TLIF, PSF, Laminectomy L3-5\par Date of Surgery: 10/06/2022\par Pain: At Rest: 9/10 \par With Activity: 9 /10 \par \par 10/19/2022 - Still experiencing immediate post operative pain (both legs and back). States his pain level is intolerable. Low back pain worse than leg pain. Difficulty with ambulation due to pain, wheelchair used in office today. Recently switched from Dilaudid to oxycodone. Relief with oxycodone for approx 1 hour. Also taking prescribed muscle relaxer. \par \par

## 2023-05-24 ENCOUNTER — APPOINTMENT (OUTPATIENT)
Dept: ORTHOPEDIC SURGERY | Facility: CLINIC | Age: 68
End: 2023-05-24

## 2023-07-24 ENCOUNTER — APPOINTMENT (OUTPATIENT)
Dept: ORTHOPEDIC SURGERY | Facility: CLINIC | Age: 68
End: 2023-07-24
Payer: MEDICARE

## 2023-07-24 VITALS — WEIGHT: 163 LBS | HEIGHT: 68 IN | BODY MASS INDEX: 24.71 KG/M2

## 2023-07-24 DIAGNOSIS — Z98.1 ARTHRODESIS STATUS: ICD-10-CM

## 2023-07-24 PROCEDURE — 99214 OFFICE O/P EST MOD 30 MIN: CPT

## 2023-07-24 PROCEDURE — 72100 X-RAY EXAM L-S SPINE 2/3 VWS: CPT

## 2023-07-27 NOTE — HISTORY OF PRESENT ILLNESS
[de-identified] : 07/24/2023 - Patient presents to the office for follow up. He has a history of thoracolumbar spinal fusion for degenerative scoliosis.\par He was last seen 3/29/23 for follow up. Subsequent to that visit, he sustained a fall while at his home in [place]. He reports that he had fracture to his left shoulder and left hip. He spent about a week in the hospital and then several weeks in rehabilitation facility nonweightbearing. He’s now back on [place] for approximately a month. He is initiating outpatient physical therapy for his shoulder and his hip. He is interested in adding outpatient physical therapy to the spine. He reports no new symptoms in terms of back pain or like symptoms. He continues to walk with a cane. He does report that there is some urinary incontinence of recent and is scheduled to see his primary care physician for work up later this week. Denies new numbness in the legs and denies new weakness in the lower extremities. \par  [FreeTextEntry5] : The patient is a 68 year old male who presents today complaining of Lumbar spine. \par Date of Injury/Onset:  ~ chronic\par Pain:    At Rest: 0/10  \par With Activity:  5/10  \par Mechanism of injury: Pt reports falling and breaking Lt hip and LT shoulder this May/23 - was hospitalized for a week and then almost 2 month in nursing home in [place]\par Quality of symptoms: Dull ache, painful\par Improves with: Tylenol - some relief, sitting, exercising\par Worse with: Walking, sleeping \par Prior treatment/ Imaging: Xray/MRI 2023 - not available\par Out of work: ;Since: \par School/Sport/Position/Occupation: Retired \par Additional Information: None\par \par

## 2023-07-27 NOTE — ASSESSMENT
[FreeTextEntry1] : 68M s/p lumbar fusion for degenerative scoliosis 10/2022, now 9mo postop.\par \par Patient can include outpatient physical therapy in his current regimen, and a prescription was provided. He will discuss urinary symptoms with his primary care physician. I do not think this is spine related. He is not experiencing any new symptoms in terms of lower extremity weakness or sensory deficit. Would like to follow up with the patient in about four weeks prior to his return to [place] and patient agrees to see us back again. \par \par Prior to appointment and during encounter with patient extensive medical records were reviewed including but not limited to, hospital records, outpatient records, imaging results, and lab data.During this appointment the patient was examined, diagnoses were discussed and explained in a face to face manner. In addition extensive time was spent reviewing aforementioned diagnostic studies. Counseling including abnormal image results, differential diagnoses, treatment options, risk and benefits, lifestyle changes, current condition, and current medications was performed. Patient's comments, questions, and concerns were addressed and patient verbalized understanding. Based on this patient's presentation at our office, which is an orthopedic spine surgeon's office, this patient inherently / intrinsically has a risk, however minute, of developing issues such as Cauda equina syndrome, bowel and bladder changes, or progression of motor or neurological deficits such as paralysis which may be permanent.

## 2023-07-27 NOTE — DATA REVIEWED
[FreeTextEntry1] : On my interpretation of these images\par in office x-rays Lumbar spine: Images compared to prior study show hardware in place without evidence of significant loosening or hardware failure. Posterior lateral fusion is noted. Interbody graft in place

## 2023-07-27 NOTE — PHYSICAL EXAM
[de-identified] : Constitutional:\par - General Appearance:\par Unremarkable\par Body Habitus\par Well Developed\par Well Nourished\par Body Habitus\par No Deformities\par Well Groomed\par Ability To communicate:\par Normal\par Neurologic:\par Global sensation is intact to upper and lower extremities. See examination of Neck and/or Spine\par for exceptions.\par Orientation to Time, Place and Person is: Normal\par Mood And Affect is Normal\par Skin:\par - Head/Face, Right Upper/Lower Extremity, Left Upper/Lower Extremity: Normal\par See Examination of Neck and/or Spine for exceptions\par Cardiovascular:\par Peripheral Cardiovascular System is Normal\par Palpation of Lymph Nodes:\par Normal Palpation of lymph nodes in: Axilla, Cervical, Inguinal\par Abnormal Palpation of lymph nodes in: None  [] : clonus not sustained at ankle [de-identified] : Patient is ambulatory with a cane. He has a slow steady gait.  [FreeTextEntry8] : Nontender to palpitation  [FreeTextEntry9] : Reduced ROM all planes

## 2023-08-18 ENCOUNTER — APPOINTMENT (OUTPATIENT)
Dept: ORTHOPEDIC SURGERY | Facility: CLINIC | Age: 68
End: 2023-08-18
Payer: MEDICARE

## 2023-08-18 DIAGNOSIS — S42.292A OTHER DISPLACED FRACTURE OF UPPER END OF LEFT HUMERUS, INITIAL ENCOUNTER FOR CLOSED FRACTURE: ICD-10-CM

## 2023-08-18 DIAGNOSIS — M75.52 BURSITIS OF LEFT SHOULDER: ICD-10-CM

## 2023-08-18 PROCEDURE — 99214 OFFICE O/P EST MOD 30 MIN: CPT

## 2023-08-18 PROCEDURE — 99204 OFFICE O/P NEW MOD 45 MIN: CPT

## 2023-08-18 PROCEDURE — 73030 X-RAY EXAM OF SHOULDER: CPT | Mod: LT

## 2023-08-18 NOTE — HISTORY OF PRESENT ILLNESS
[de-identified] : This is a 67 yo male presenting to the office with  c/o ongoing left shoulder pain since May 14th after a fall in [place].  Pt ended up fracturing his shoulder as a result of the fall. Pt has had several hip and back surgeries. Pt had CT scan done at [facility]. Pt saw Dr. Clark for a first opinion on the shoulder who recommended a shoulder replacement. Pain is described as constant, severe in quality. Currently pain is 8/10 and non-radiating. Patient reports pain has been getting progressively worse. Pain is worse at night. Overall pain does improve with rest and ice. Patient denies any numbness or tingling.

## 2023-08-18 NOTE — DISCUSSION/SUMMARY
[de-identified] : LUMACRINA: AAFF 120 deg, AFF 90 deg  This is a 67 yo male presenting to the office c/o ongoing left shoulder pain since May after a fall in [place]. Prior Ct scan demonstrates impacted fracture  Xray demonstrates proximal humerus fracture with impaction  Patient presented to me for second opinion therefore I emphasized that my opinion is merely that and should in no way indicate that the previous providers treatment plan is inappropriate. I did advise that a reverse shoulder arthroplasty would be the surgical treatment of choice however it is typically my strategy to wait 6 months to 8 months after injury to gauge the necessity of this operation.  Patient is currently in physical therapy, however I explained that supine forward flexion exercises would be my recommendation at this point without formal physical therapy.  Patient will follow-up in 4 to 6 weeks to determine if he is improving with respect to pain and range of motion. I explained that following up with Dr. Clark would also be a reasonable option and that expedited reverse shoulder replacement is certainly a reasonable an option as well.   (1) We discussed a comprehensive treatment plans that included a prescription management plan involving the use of prescription strength medications to include Ibuprofen 600-800 mg TID, versus 500-650 mg Tylenol. We also discussed prescribing topical diclofenac (Voltaren gel) as well as once daily Meloxicam 15 mg. (2) The patient has More Than One chronic injuries/illnesses as outlined, discussed, and documented by ICD 10 codes listed, as well as the HPI and Plan section. There is a moderate risk of morbidity with further treatment, especially from use of prescription strength medications and possible side effects of these medications which include upset stomach and cardiac/renal issues with long term use were discussed. (3) I recommended that the patient follow-up with their medical physician to discuss any significant specific potential issues with long term use such as interactions with current medications or with exacerbation of underlying medical morbidities.   Attestation: I, Sophia COLLINS'Sia , attest that this documentation has been prepared under the direction and in the presence of Provider Cesar Beard MD. The documentation recorded by the scribe, in my presence, accurately reflects the service I personally performed, and the decisions made by me with my edits as appropriate. Cesar Beard MD

## 2023-08-18 NOTE — PHYSICAL EXAM
[Left] : left shoulder [Fracture] : Fracture [Type 2 acromion] : Type 2 acromion [de-identified] : Constitutional: The general appearance of the patient is well developed, well nourished, no deformities and well groomed. Normal  Gait: Gait and function is as follows: normal gait.  Skin: Head and neck visualized skin is normal. Left upper extremity visualized skin is normal. Right upper extremity visualized skin is normal. Thoracic Skin of the thoracic spine shows visualized skin is normal.  Cardiovascular: palpable radial pulse bilaterally, good capillary refill in digits of the bilateral upper extremities and no temperature or color changes in the bilateral upper extremities.  Lymphatic: Normal Palpation of lymph nodes in the cervical.  Neurologic: fine motor control in the bilateral upper extremities is intact. Deep Tendon Reflexes in Upper and Lower Extremities Negative Guadalupe's in the bilateral upper extremities. The patient is oriented to time, place and person. Sensation to light touch intact in the bilateral upper extremities. Mood and Affect is normal.  Right Shoulder: Inspection of the shoulder/upper arm is as follows: There is a full, pain-free, stable range of motion of the shoulder with normal strength and no tenderness to palpation.  Left Shoulder: Inspection of the shoulder/upper arm is as follows: no scapula winging, no biceps deformity and no AC joint deformity. Palpation of the shoulder/upper arm is as follows: There is tenderness at the proximal biceps tendon. Range of motion of the shoulder is as follows: Pain with internal rotation, external rotation, abduction and forward flexion. Strength of the shoulder is as follows: Supraspinatus 4/5. External Rotation 4/5. Internal Rotation 4/5. Deltoid 5/5 Ligament Stability and Special Tests of the shoulder is as follows: Neer test is positive. Matias' test is positive. Speed's test is positive.  Neck:  Inspection / Palpation of the cervical spine is as follows: mild paracervical tenderness. Range of motion of the cervical spine is as follows: moderately decreased range of motion of the cervical spine. Stability testing for the cervical spine is as follows Stable range of motion.  Back, including spine: Inspection / Palpation of the thoracic/lumbar spine is as follows: There is a full, pain free, stable range of motion of the thoracic spine with a normal tone and not tenderness to palpation..

## 2023-08-23 ENCOUNTER — APPOINTMENT (OUTPATIENT)
Dept: ORTHOPEDIC SURGERY | Facility: CLINIC | Age: 68
End: 2023-08-23
Payer: MEDICARE

## 2023-08-23 VITALS — BODY MASS INDEX: 24.71 KG/M2 | HEIGHT: 68 IN | WEIGHT: 163 LBS

## 2023-08-23 DIAGNOSIS — M54.16 RADICULOPATHY, LUMBAR REGION: ICD-10-CM

## 2023-08-23 DIAGNOSIS — G95.9 DISEASE OF SPINAL CORD, UNSPECIFIED: ICD-10-CM

## 2023-08-23 PROCEDURE — 99213 OFFICE O/P EST LOW 20 MIN: CPT

## 2023-08-23 PROCEDURE — 72070 X-RAY EXAM THORAC SPINE 2VWS: CPT

## 2023-08-23 PROCEDURE — 72100 X-RAY EXAM L-S SPINE 2/3 VWS: CPT

## 2023-08-27 NOTE — HISTORY OF PRESENT ILLNESS
[de-identified] : 08/23/2023: Patient presenting for a follow up, s/p Lumbar laminectomy and fusion, T10 Pelvis TLIF, PSF, Laminectomy L3-5. Patient states no doing so great with pain in the mid back. C/o that his legs hurt. PT is alright and exercises and massages can be helpeful. Just takes Tylenol that slightly eases the pain. He also c/o left shoulder pain,  s/p fall w/ GT fracture 3 months ago with no healing as stated by outside orthopedic. He saw Dr. Beard who suggested waiting some months before surgical intervention.   3/29/23: Patient presenting for a follow up, s/p Lumbar laminectomy and fusion, T10 Pelvis TLIF, PSF, Laminectomy L3-5. Patient experiencing worse symptoms since last visit due to discontinued physical therapy. Patient reports relief of symptoms while enrolled in formal PT trial. He is completing independent exercises at the gym, pain is breaking through. His chief complaint today is left sided focal back pain. No pain, numbness/tingling, or changes in strength in the lower extremities b/l. Gait is intact. Medicine wise he is using tramadol and extra strength Tylenol which is helpful.   02/15/2023 - 68 y/o M presenting for a follow up. Since his last visit, low back pain is gradually improving from physical therapy 3 x / week, which is helpful. 1 fall since his last visit due to instability, not weakness. Sciatica and subjective weakness is noted in the b/l legs, improving from PT. Tramadol reduces the severity of his pains, as well as Tylenol.  Date of Surgery:10/06/2022 Pain: At Rest: 0/10  With Activity: 9-10/10  Treatment/ Imagining/ Studies since last visit: PT 3x a week Additional information: pain radiates to tailbone  01/25/2023: The patient is s/p Lumbar laminectomy and fusion, T10 Pelvis TLIF, PSF, Laminectomy L3-5. He reports aggravated symptoms since his last visit. Complains of all over body pain,  paraspinal low back pain. Balance has been gradually improving. Difficulty with posture. Reports fall the other night, hurt his knee, but states it was low impact. Completing PT 3 x / week, which he states is helpful. Continued neck pain.  Date of Surgery:10/06/2022 Pain:    At Rest: _/10  With Activity:  _/10   12/28/2022: The patient is s/p Lumbar laminectomy and fusion, T10 Pelvis TLIF, PSF, Laminectomy L3-5. Reports fall since his last visit, as a result he received surgery in the posterior region of the neck. Completed post hospital physical therapy [place], currently enrolled in OPPT. Experiencing expressive aphasia after most recent operation. Symptom in the neck have aggravated his back pain. He is actively ambulating, no longer using cane. balance is normal. Dexterity is normal. He has continued medication regimen with tramadol. No longer using oxycodone.   Date of Surgery: 10/06/2022 Pain:    At Rest: 4/10  With Activity:  4/10   11/16/2022 - 68 y/o male presenting in follow up. Overall he states he is doing well, but aggravated symptoms today. Using bone fusion stimulator, states episodic pain 2 X after usage. Continues to use morphine sulfate extended release, Tylenol, and gabapentin, weening off. Ambulating well, posture is normal. Using cane for ambulation as a safety precaution. Mild sciatic pains in the b/l lower extremities.   11/02/2022 - Symptoms significantly improved since his last visit from treatment with morphine sulfate extended-release (every 6 hours). Using Tylenol and gabapentin. Still experiencing pain, but moving in the right direction. Ambulating with cane in office today. Leg pain is still present, but improving. Walking tolerance improving.   10/19/2022 - The patient is s/p lumbar laminectomy and fusion, T10 Pelvis TLIF, PSF, Laminectomy L3-5 Date of Surgery: 10/06/2022 Pain: At Rest: 9/10  With Activity: 9 /10   10/19/2022 - Still experiencing immediate post operative pain (both legs and back). States his pain level is intolerable. Low back pain worse than leg pain. Difficulty with ambulation due to pain, wheelchair used in office today. Recently switched from Dilaudid to oxycodone. Relief with oxycodone for approx 1 hour. Also taking prescribed muscle relaxer.

## 2023-08-27 NOTE — PHYSICAL EXAM
[Normal Coordination] : normal coordination [Normal DTR UE/LE] : normal DTR UE/LE  [Normal Sensation] : normal sensation [Normal Mood and Affect] : normal mood and affect [Orientated] : orientated [Able to Communicate] : able to communicate [Normal Skin] : normal skin [No obvious lymphadenopathy in areas examined] : no obvious lymphadenopathy in areas examined [Well Developed] : well developed [Well Nourished] : well nourished [Peripheral vascular exam is grossly normal] : peripheral vascular exam is grossly normal [No Respiratory Distress] : no respiratory distress [Flexion] : flexion [Extension] : extension [] : clonus not sustained at ankle

## 2023-08-27 NOTE — DATA REVIEWED
[FreeTextEntry1] : I independently reviewed and interpreted images in office x-rays Lumbar and t-spine ap/lat demonstrates Images compared to prior study show hardware in place without evidence of significant loosening or hardware failure.  I stop paperwork reviewed PT progress notes reviewed

## 2023-08-27 NOTE — DISCUSSION/SUMMARY
[de-identified] : Recommended patient Continue supervised PT trial. Renewal provided for PT. He will continue current medication regiment with and Tylenol PRN . Prescription provided for medical massage. Caution with Tylenol use addressed. He will follow with his pain management and if he is considering injections, TPI may help with the lumbar spasms, and RFA may be helpful, but would have to be performed higher up.   In regard to his left shoulder if his pain persists, with worse days then better, then he may need to consider surgical intervention. I do not suspect the fracture with heal if it has not 3 months out.   Prior to appointment and during encounter with patient extensive medical records were reviewed including but not limited to, hospital records, outpatient records, imaging results, and lab data. During this appointment the patient was examined, diagnoses were discussed and explained in a face to face manner. In addition extensive time was spent reviewing aforementioned diagnostic studies. Counseling including abnormal image results, differential diagnoses, treatment options, risk and benefits, lifestyle changes, current condition, and current medications was performed. Patient's comments, questions, and concerns were addressed and patient verbalized understanding. Based on this patient's presentation at our office, which is an orthopedic spine surgeon's office, this patient inherently / intrinsically has a risk, however minute, of developing issues such as Cauda equina syndrome, bowel and bladder changes, or progression of motor or neurological deficits such as paralysis which may be permanent.  Daljit Dow Acting as a Scribe for Dr. Beto SU, Daljit Dow, attest that this documentation has been prepared under the direction and in the presence of Provider Mihai Pérez MD.

## 2023-09-29 ENCOUNTER — APPOINTMENT (OUTPATIENT)
Dept: ORTHOPEDIC SURGERY | Facility: CLINIC | Age: 68
End: 2023-09-29

## 2023-10-04 ENCOUNTER — APPOINTMENT (OUTPATIENT)
Dept: ORTHOPEDIC SURGERY | Facility: CLINIC | Age: 68
End: 2023-10-04

## 2023-11-15 ENCOUNTER — APPOINTMENT (OUTPATIENT)
Dept: ORTHOPEDIC SURGERY | Facility: CLINIC | Age: 68
End: 2023-11-15
Payer: MEDICARE

## 2023-11-15 VITALS — HEIGHT: 68 IN | WEIGHT: 163 LBS | BODY MASS INDEX: 24.71 KG/M2

## 2023-11-15 PROCEDURE — 99213 OFFICE O/P EST LOW 20 MIN: CPT

## 2023-11-29 ENCOUNTER — APPOINTMENT (OUTPATIENT)
Dept: ORTHOPEDIC SURGERY | Facility: CLINIC | Age: 68
End: 2023-11-29

## 2023-12-15 ENCOUNTER — APPOINTMENT (OUTPATIENT)
Dept: ORTHOPEDIC SURGERY | Facility: CLINIC | Age: 68
End: 2023-12-15
Payer: MEDICARE

## 2023-12-15 VITALS — BODY MASS INDEX: 24.71 KG/M2 | HEIGHT: 68 IN | WEIGHT: 163 LBS

## 2023-12-15 DIAGNOSIS — M41.50 OTHER SECONDARY SCOLIOSIS, SITE UNSPECIFIED: ICD-10-CM

## 2023-12-15 PROCEDURE — 99214 OFFICE O/P EST MOD 30 MIN: CPT

## 2023-12-17 NOTE — DISCUSSION/SUMMARY
[de-identified] : CT reveals interval development of subtle lucency associated with the screws extending through the iliac bones raising potential possibility of loosening,  I discussed with the patient the potential pain generators, particularly focusing on the upper right-sided mid-back pain. I don't believe this pain is related to a potential loose screw, as such issues typically manifest as lower back pain. We explored the option of resuming treatment with tramadol; however, the patient has chosen to continue with Tylenol. As an absolute last resort, we discussed the possibility of revision surgery if the pain continues to be functionally incapacitating. However, it was not strongly suggested as the patient's pain seems inconsistent with the location of a loose screw. Also deiscussed possible dorsal column stimulator trial.  Prior to appointment and during encounter with patient extensive medical records were reviewed including but not limited to, hospital records, outpatient records, imaging results, and lab data. During this appointment the patient was examined, diagnoses were discussed and explained in a face-to-face manner. In addition, extensive time was spent reviewing aforementioned diagnostic studies. Counseling including abnormal image results, differential diagnoses, treatment options, risk and benefits, lifestyle changes, current condition, and current medications was performed. Patient's comments, questions, and concerns were addressed, and patient verbalized understanding. Based on this patient's presentation at our office, which is an orthopedic spine surgeon's office, this patient inherently / intrinsically has a risk, however minute, of developing issues such as Cauda equina syndrome, bowel and bladder changes, or progression of motor or neurological deficits such as paralysis which may be permanent.  ISI SUTHERLAND Acting as a Scribe for Dr. Beto SU, Isi Sutherland, attest that this documentation has been prepared under the direction and in the presence of Provider Mihai Pérez MD.

## 2023-12-17 NOTE — HISTORY OF PRESENT ILLNESS
[de-identified] : 12/15/2023 - Patient presenting for review of CT complaining of right sided upper back pain, with also intermittent radiating leg pain. Patient describes his pain as constant. Extended standing and walking is limiting due to back pain. States his balance has been improved. Has been treating with Tylenol TID with some relief. Patient was progressing with formal physical therapy, but no more sessions were authorized.   11/15/2023 - Patient presenting for a follow up, s/p Lumbar laminectomy and fusion, T10 Pelvis TLIF, PSF, Laminectomy L3-5 (10/06/2022). He complains of heighted back pain and that he was unable to walk without pain the past 5 days. He also has secondary complaints of leg pain/numbness/tingling, but his primary complaint is diffuse back pain.  Treating with Tylenol, d/c Tramadol.   08/23/2023: Patient presenting for a follow up, s/p Lumbar laminectomy and fusion, T10 Pelvis TLIF, PSF, Laminectomy L3-5. Patient states no doing so great with pain in the mid back. C/o that his legs hurt. PT is alright and exercises and massages can be helpeful. Just takes Tylenol that slightly eases the pain. He also c/o left shoulder pain,  s/p fall w/ GT fracture 3 months ago with no healing as stated by outside orthopedic. He saw Dr. Beard who suggested waiting some months before surgical intervention.   3/29/23: Patient presenting for a follow up, s/p Lumbar laminectomy and fusion, T10 Pelvis TLIF, PSF, Laminectomy L3-5. Patient experiencing worse symptoms since last visit due to discontinued physical therapy. Patient reports relief of symptoms while enrolled in formal PT trial. He is completing independent exercises at the gym, pain is breaking through. His chief complaint today is left sided focal back pain. No pain, numbness/tingling, or changes in strength in the lower extremities b/l. Gait is intact. Medicine wise he is using tramadol and extra strength Tylenol which is helpful.   02/15/2023 - 66 y/o M presenting for a follow up. Since his last visit, low back pain is gradually improving from physical therapy 3 x / week, which is helpful. 1 fall since his last visit due to instability, not weakness. Sciatica and subjective weakness is noted in the b/l legs, improving from PT. Tramadol reduces the severity of his pains, as well as Tylenol.  Date of Surgery:10/06/2022 Pain: At Rest: 0/10  With Activity: 9-10/10  Treatment/ Imagining/ Studies since last visit: PT 3x a week Additional information: pain radiates to tailbone  01/25/2023: The patient is s/p Lumbar laminectomy and fusion, T10 Pelvis TLIF, PSF, Laminectomy L3-5. He reports aggravated symptoms since his last visit. Complains of all over body pain,  paraspinal low back pain. Balance has been gradually improving. Difficulty with posture. Reports fall the other night, hurt his knee, but states it was low impact. Completing PT 3 x / week, which he states is helpful. Continued neck pain.  Date of Surgery:10/06/2022 Pain:    At Rest: _/10  With Activity:  _/10   12/28/2022: The patient is s/p Lumbar laminectomy and fusion, T10 Pelvis TLIF, PSF, Laminectomy L3-5. Reports fall since his last visit, as a result he received surgery in the posterior region of the neck. Completed post hospital physical therapy [place], currently enrolled in OPPT. Experiencing expressive aphasia after most recent operation. Symptom in the neck have aggravated his back pain. He is actively ambulating, no longer using cane. balance is normal. Dexterity is normal. He has continued medication regimen with tramadol. No longer using oxycodone.   Date of Surgery: 10/06/2022 Pain:    At Rest: 4/10  With Activity:  4/10   11/16/2022 - 66 y/o male presenting in follow up. Overall he states he is doing well, but aggravated symptoms today. Using bone fusion stimulator, states episodic pain 2 X after usage. Continues to use morphine sulfate extended release, Tylenol, and gabapentin, weening off. Ambulating well, posture is normal. Using cane for ambulation as a safety precaution. Mild sciatic pains in the b/l lower extremities.   11/02/2022 - Symptoms significantly improved since his last visit from treatment with morphine sulfate extended-release (every 6 hours). Using Tylenol and gabapentin. Still experiencing pain, but moving in the right direction. Ambulating with cane in office today. Leg pain is still present, but improving. Walking tolerance improving.   10/19/2022 - The patient is s/p lumbar laminectomy and fusion, T10 Pelvis TLIF, PSF, Laminectomy L3-5 Date of Surgery: 10/06/2022 Pain: At Rest: 9/10  With Activity: 9 /10   10/19/2022 - Still experiencing immediate post operative pain (both legs and back). States his pain level is intolerable. Low back pain worse than leg pain. Difficulty with ambulation due to pain, wheelchair used in office today. Recently switched from Dilaudid to oxycodone. Relief with oxycodone for approx 1 hour. Also taking prescribed muscle relaxer.

## 2024-01-31 ENCOUNTER — APPOINTMENT (OUTPATIENT)
Dept: ORTHOPEDIC SURGERY | Facility: CLINIC | Age: 69
End: 2024-01-31

## 2024-03-22 NOTE — ASU PREOP CHECKLIST - SITE MARKED BY SURGEON
Patient arrives via bed  from unit 1 B.  Transferred to room 139.  In bed awake, calm, cooperative, and stable.  LTME on and intact.  Oriented to room/unit and patient verbalizes understanding of his plan of care.  Call light within reach.  Will continue to monitor.   yes

## (undated) DEVICE — SUT PERMAHAND 2-0 18" FSL

## (undated) DEVICE — CATH IV SAFE BC PINK 20GA X 1.16"

## (undated) DEVICE — MARKER SKIN MULTI TIP 6"

## (undated) DEVICE — DISSECTING TOOL MIDAS REX 10CM 2MM

## (undated) DEVICE — DRSG STERISTRIPS 0.5X4"

## (undated) DEVICE — Device

## (undated) DEVICE — DRAIN JACKSON PRATT 3 SPRING RESERVOIR W 10FR PVC DRAIN

## (undated) DEVICE — BLANKET WARMER UPPER ADULT

## (undated) DEVICE — FOLEY TRAY 16FR LF URINE METER SURESTEP

## (undated) DEVICE — ELCTR EDGE BOVIE INSULATED BLADE TIP 2.75"

## (undated) DEVICE — DRAPE INSTRUMENT POUCH

## (undated) DEVICE — TUBING BIPOLAR IRRIGATOR AND CORD SET

## (undated) DEVICE — DRSG TEGADERM 4X4.75"

## (undated) DEVICE — GLV 7.5 PROTEXIS

## (undated) DEVICE — TAP MESA SPINAL SYS 4.5MM

## (undated) DEVICE — SUT VICRYL 1 36" CT-1 UNDYED

## (undated) DEVICE — TAP MESA 2 SPINAL SYSTEM 5.5MM

## (undated) DEVICE — DRAPE TOWEL BLUE 17" X 24"

## (undated) DEVICE — FORCEP BIPOLAR BAYONET STR 7" W 1.5MM TIP DISP

## (undated) DEVICE — ELCTR BOVIE HANDHELD PENCIL ROCKER SWITCH 15FT

## (undated) DEVICE — BLANKET WARMER LOWER ADULT

## (undated) DEVICE — TAP MESA SPINAL INST 6.5MM

## (undated) DEVICE — ELCTR GROUNDING PAD ADULT COVIDIEN

## (undated) DEVICE — NDL HYPO SAFE 18G X 1.5"

## (undated) DEVICE — DRAPE C ARM UNIVERSAL

## (undated) DEVICE — KT PULSAVAC WOUND W/ SPRAYTIP

## (undated) DEVICE — PACK NEURO SO SIDE

## (undated) DEVICE — DRAPE U LONG 47X70"

## (undated) DEVICE — SUT MONOCRYL 3-0 18" PS-2 UNDYED

## (undated) DEVICE — SUT VICRYL 0 18" CT-1 UNDYED

## (undated) DEVICE — WRAP COMPRESSION CALF MED

## (undated) DEVICE — SUT ETHILON 3-0 18" PS-1

## (undated) DEVICE — PREP SCRUB IODO DURAPREP 26CC

## (undated) DEVICE — SPONGE PEANUT AUTO COUNT

## (undated) DEVICE — DRAPE SPLIT SHEETS 77X108"

## (undated) DEVICE — SUT MONOCRYL 3-0 27" PS-2 UNDYED